# Patient Record
Sex: FEMALE | HISPANIC OR LATINO | Employment: FULL TIME | URBAN - METROPOLITAN AREA
[De-identification: names, ages, dates, MRNs, and addresses within clinical notes are randomized per-mention and may not be internally consistent; named-entity substitution may affect disease eponyms.]

---

## 2019-09-04 ENCOUNTER — TRANSCRIBE ORDERS (OUTPATIENT)
Dept: ADMINISTRATIVE | Facility: HOSPITAL | Age: 54
End: 2019-09-04

## 2019-09-23 ENCOUNTER — TRANSCRIBE ORDERS (OUTPATIENT)
Dept: MAMMOGRAPHY | Facility: HOSPITAL | Age: 54
End: 2019-09-23

## 2019-09-23 DIAGNOSIS — Z12.31 VISIT FOR SCREENING MAMMOGRAM: Primary | ICD-10-CM

## 2019-10-07 ENCOUNTER — APPOINTMENT (OUTPATIENT)
Dept: OTHER | Facility: HOSPITAL | Age: 54
End: 2019-10-07

## 2019-10-07 ENCOUNTER — HOSPITAL ENCOUNTER (OUTPATIENT)
Dept: MAMMOGRAPHY | Facility: HOSPITAL | Age: 54
Discharge: HOME | End: 2019-10-07
Admitting: OBSTETRICS & GYNECOLOGY

## 2019-10-07 DIAGNOSIS — Z12.31 VISIT FOR SCREENING MAMMOGRAM: ICD-10-CM

## 2019-10-07 PROCEDURE — 77067 SCR MAMMO BI INCL CAD: CPT

## 2019-10-07 PROCEDURE — 77063 BREAST TOMOSYNTHESIS BI: CPT | Performed by: RADIOLOGY

## 2019-10-07 PROCEDURE — 77063 BREAST TOMOSYNTHESIS BI: CPT

## 2019-10-07 PROCEDURE — 77067 SCR MAMMO BI INCL CAD: CPT | Performed by: RADIOLOGY

## 2020-09-09 ENCOUNTER — TRANSCRIBE ORDERS (OUTPATIENT)
Dept: ADMINISTRATIVE | Facility: HOSPITAL | Age: 55
End: 2020-09-09

## 2020-09-09 DIAGNOSIS — Z12.31 VISIT FOR SCREENING MAMMOGRAM: Primary | ICD-10-CM

## 2020-10-12 ENCOUNTER — OFFICE VISIT (OUTPATIENT)
Dept: OBSTETRICS AND GYNECOLOGY | Facility: CLINIC | Age: 55
End: 2020-10-12

## 2020-10-12 VITALS
WEIGHT: 111 LBS | DIASTOLIC BLOOD PRESSURE: 60 MMHG | HEIGHT: 65 IN | SYSTOLIC BLOOD PRESSURE: 92 MMHG | BODY MASS INDEX: 18.49 KG/M2

## 2020-10-12 DIAGNOSIS — N94.10 FEMALE DYSPAREUNIA: ICD-10-CM

## 2020-10-12 DIAGNOSIS — Z01.411 ENCOUNTER FOR GYNECOLOGICAL EXAMINATION WITH ABNORMAL FINDING: Primary | ICD-10-CM

## 2020-10-12 DIAGNOSIS — R63.4 WEIGHT LOSS: ICD-10-CM

## 2020-10-12 PROCEDURE — 99396 PREV VISIT EST AGE 40-64: CPT | Performed by: OBSTETRICS & GYNECOLOGY

## 2020-10-12 RX ORDER — MELATONIN
1000 DAILY
COMMUNITY
End: 2022-10-07 | Stop reason: SDUPTHER

## 2020-10-12 RX ORDER — PRASTERONE 6.5 MG/1
1 INSERT VAGINAL DAILY
COMMUNITY
End: 2020-10-12 | Stop reason: SDUPTHER

## 2020-10-12 RX ORDER — PRASTERONE 6.5 MG/1
1 INSERT VAGINAL DAILY
Qty: 90 EACH | Refills: 3 | Status: SHIPPED | OUTPATIENT
Start: 2020-10-12 | End: 2021-10-19 | Stop reason: SDUPTHER

## 2020-10-12 NOTE — PROGRESS NOTES
GYN Annual Exam     CC - Here for annual exam.     Subjective   HPI  Tiny Jolley is a 55 y.o. female, , who presents for annual well woman exam.  She is postmenopausal.  Patient reports problems with: vaginal dryness and vaginal itching. She is not currently sexually active b/c of such bad dyspareunia.    Partner Status: Marital Status: .  New Partners since last visit: no.      Additional OB/GYN History   Current contraception: contraceptive methods: Post menopausal status  Desires to: continue contraception  Last Pap :   Last Completed Pap Smear       Status Date      PAP SMEAR Done 2019 neg, neg HPV        History of abnormal Pap smear: no  Family history of uterine, colon, breast, or ovarian cancer: no  Performs monthly Self-Breast Exam: yes  Last mammogram:   Last Completed Mammogram       Status Date      MAMMOGRAM Done 10/7/2019 MAMMO SCREENING DIGITAL TOMOSYNTHESIS BILATERAL W CAD        Last colonoscopy:   Last Completed Colonoscopy       Status Date      COLONOSCOPY Done 3/1/2020 nl per pt        Last DEXA: 2017 osteopenia   Exercises Regularly: no  Feelings of Anxiety or Depression: occasional sadness but denies depression    Tobacco Usage?: No   OB History        2    Para   1    Term   1            AB   1    Living           SAB   1    TAB        Ectopic        Molar        Multiple        Live Births                    Health Maintenance   Topic Date Due   • Annual Gynecologic Pelvic and Breast Exam  1965   • ANNUAL PHYSICAL  1968   • ZOSTER VACCINE (1 of 2) 2015   • INFLUENZA VACCINE  2020   • HEPATITIS C SCREENING  2020   • LIPID PANEL  10/12/2020   • MAMMOGRAM  10/07/2021   • PAP SMEAR  2022   • TDAP/TD VACCINES (2 - Td) 2027   • COLONOSCOPY  2030   • Pneumococcal Vaccine 0-64  Aged Out       The additional following portions of the patient's history were reviewed and updated as appropriate: allergies,  "current medications, past family history, past medical history, past social history, past surgical history and problem list.    Review of Systems   Constitutional: Negative.    HENT: Negative.    Eyes: Negative.    Respiratory: Negative.    Cardiovascular: Negative.    Gastrointestinal: Negative.    Endocrine: Negative.    Genitourinary: Negative for vaginal pain (dryness, itching).   Musculoskeletal: Negative.    Skin: Negative.    Allergic/Immunologic: Negative.    Neurological: Negative.    Hematological: Negative.    Psychiatric/Behavioral: Negative.        I have reviewed and agree with the HPI, ROS, and historical information as entered above. Ria Sofia MD    Objective   BP 92/60   Ht 165.1 cm (65\")   Wt 50.3 kg (111 lb)   BMI 18.47 kg/m²     Physical Exam    General:  well developed; well nourished  no acute distress  Skin:  No suspicious lesions seen  Thyroid: normal to inspection and palpation  Breasts:  Examined in supine position  Symmetric without masses or skin dimpling  Nipples normal without inversion, lesions or discharge  There are no palpable axillary nodes  CVS: RRR, no M/R/G, distal pulses wnl  Resp: CTAB, No W/R/R  Abdomen: soft, non-tender; no masses  no umbilical or inguinal hernias are present  no hepato-splenomegaly  Pelvis: Clinical staff was present for exam  External genitalia:  normal appearance of the external genitalia including Bartholin's and [place]'s glands.  Urethra: no masses or tenderness  Urethral meatus: normal size;  No lesions or signs of prolapse  Bladder: non tender to palpation, no masses, no prolapse  Vagina:  normal pink mucosa without prolapse or lesions.  Cervix:  normal appearance.  Uterus:  normal size, shape and consistency.  Adnexa:  normal bimanual exam of the adnexa.  Perineum/Anus: normal appearance, no external hemorrhoids  Ext: 2+ pulses, no edema    Assessment/Plan     Assessment     Problem List Items Addressed This Visit     Encounter for " gynecological examination with abnormal finding - Primary    Female dyspareunia    Weight loss          Plan     1. Pap screening guidelines reviewed  2. Pap not indicated today  3. Reviewed monthly self breast exams.  Instructed to call with lumps, pain, or breast discharge.    4. Reviewed exercise and healthy diet as a preventative health measures.   5. RTC in 1 year or PRN with problems  6. Other: Reviewed ~8lb weight loss in a year but pt feels like she's lost muscle mass.  Encouraged protein in diet, stress reduction.  Will f/u with PCP if not helping.    Ria Sofia MD  10/12/2020

## 2020-10-22 ENCOUNTER — HOSPITAL ENCOUNTER (OUTPATIENT)
Dept: MAMMOGRAPHY | Facility: HOSPITAL | Age: 55
Discharge: HOME | End: 2020-10-22
Admitting: OBSTETRICS & GYNECOLOGY

## 2020-10-22 DIAGNOSIS — Z12.31 VISIT FOR SCREENING MAMMOGRAM: ICD-10-CM

## 2020-10-22 PROCEDURE — 77067 SCR MAMMO BI INCL CAD: CPT

## 2020-10-22 PROCEDURE — 77063 BREAST TOMOSYNTHESIS BI: CPT

## 2020-10-22 PROCEDURE — 77063 BREAST TOMOSYNTHESIS BI: CPT | Performed by: RADIOLOGY

## 2020-10-22 PROCEDURE — 77067 SCR MAMMO BI INCL CAD: CPT | Performed by: RADIOLOGY

## 2021-01-19 ENCOUNTER — OFFICE VISIT (OUTPATIENT)
Dept: OBSTETRICS AND GYNECOLOGY | Facility: CLINIC | Age: 56
End: 2021-01-19

## 2021-01-19 VITALS
DIASTOLIC BLOOD PRESSURE: 66 MMHG | BODY MASS INDEX: 18.61 KG/M2 | WEIGHT: 105 LBS | HEIGHT: 63 IN | SYSTOLIC BLOOD PRESSURE: 100 MMHG

## 2021-01-19 DIAGNOSIS — N63.10 BREAST MASS, RIGHT: Primary | ICD-10-CM

## 2021-01-19 DIAGNOSIS — N94.10 FEMALE DYSPAREUNIA: ICD-10-CM

## 2021-01-19 PROCEDURE — 99213 OFFICE O/P EST LOW 20 MIN: CPT | Performed by: OBSTETRICS & GYNECOLOGY

## 2021-01-19 NOTE — PROGRESS NOTES
Chief Complaint   Patient presents with   • Follow-up     right breast pain       Subjective   HPI  Tiny Jolley is a 55 y.o. female, , who presents for right breast pain.  She had an accident at work.  She was hit by a laundry cart on the right side of her neck.        She states that about a week after her accident she had right breast pain at the 6 o'clock for about a week.  She reports the pain radiated up her right rib cage into her right arm.  She describes the severity at the time as moderate, and reports that the pain was intermittent.  She states that the problem as resolved at this time.  Denies nipple discharge.  The patient reports additional symptoms as weight loss.  She has lost 6lb unintentionally since AE in October.    Her last LMP was No LMP recorded (lmp unknown). Patient is postmenopausal..  Partner Status: Marital Status: .  New Partners since last visit: no.  Desires STD Screening: no.    Additional OB/GYN History   Current contraception: contraceptive methods: Post menopausal status  Desires to: continue contraception  Last Pap :   Last Completed Pap Smear       Status Date      PAP SMEAR Done 2019 neg, neg HPV        History of abnormal Pap smear: no  Last mammogram:   Last Completed Mammogram       Status Date      MAMMOGRAM Done 10/22/2020 MAMMO SCREENING DIGITAL TOMOSYNTHESIS BILATERAL W CAD     Patient has more history with this topic...        Tobacco Usage?: No   OB History        2    Para   1    Term   1            AB   1    Living           SAB   1    TAB        Ectopic        Molar        Multiple        Live Births                    Health Maintenance   Topic Date Due   • ANNUAL PHYSICAL  1968   • ZOSTER VACCINE (1 of 2) 2015   • INFLUENZA VACCINE  2020   • HEPATITIS C SCREENING  2020   • LIPID PANEL  10/12/2020   • Annual Gynecologic Pelvic and Breast Exam  10/13/2021   • PAP SMEAR  2022   • MAMMOGRAM   "10/22/2022   • TDAP/TD VACCINES (2 - Td) 01/01/2027   • COLONOSCOPY  03/01/2030   • Pneumococcal Vaccine 0-64  Aged Out   • MENINGOCOCCAL VACCINE  Aged Out       The additional following portions of the patient's history were reviewed and updated as appropriate: allergies, current medications, past family history, past medical history, past social history, past surgical history and problem list.    Review of Systems   Constitutional: Positive for unexpected weight loss.   HENT: Negative.    Eyes: Negative.    Respiratory: Negative.    Cardiovascular: Negative.    Gastrointestinal: Negative.    Endocrine: Negative.    Genitourinary: Positive for breast pain.   Musculoskeletal: Negative.    Skin: Negative.    Allergic/Immunologic: Negative.    Neurological: Negative.    Hematological: Negative.    Psychiatric/Behavioral: Negative.        I have reviewed and agree with the HPI, ROS, and historical information as entered above. Ria Sofia MD    Objective   /66 (BP Location: Right arm, Patient Position: Sitting)   Ht 160 cm (63\")   Wt 47.6 kg (105 lb)   LMP  (LMP Unknown)   Breastfeeding No   BMI 18.60 kg/m²     Physical Exam    Physical Exam     General:          well developed; thin/frail  no acute distress  Skin:    No suspicious lesions seen  Thyroid:           normal to inspection and palpation  Breasts:           Examined in supine position  Symmetric.  Fibrocystic breasts, area of pain in right breast ~1cm inferior to nipple ~1cm cystic area.  Nipples normal without inversion, lesions or discharge  There are no palpable axillary nodes  Resp: CTAB, No W/R/R  Abdomen:        soft, non-tender; no masses  no umbilical or inguinal hernias are present  no hepato-splenomegaly  Pelvis:  deferred  Ext: 2+ pulses, no edema    Assessment/Plan     Assessment     Problem List Items Addressed This Visit     Female dyspareunia    Breast mass, right - Primary    Relevant Orders    US Breast Right Complete    " Mammo Diagnostic Digital Tomosynthesis Right With CAD          1. Breast mass  2. Dyspareunia    Plan     Return for Annual physical.   Right diagnostic mammo/sono ordered but I think pain c/w cyst.  Encouraged decreasing caffeine and can try low dose Vit E for breast pain.    Can add luvena in addition to intrarosa for vulvovaginal symptoms, which are chronic  Weight loss - encouraged patient to see PCP.  Briefly reviewed depression/anxiety can be related to weight loss.  She has supplemented with premier protein.  Her work is strenuous and she feels she burns a lot of calories at her job.      Ria Sofia MD  01/19/2021

## 2021-02-05 ENCOUNTER — HOSPITAL ENCOUNTER (OUTPATIENT)
Dept: ULTRASOUND IMAGING | Facility: HOSPITAL | Age: 56
Discharge: HOME | End: 2021-02-05

## 2021-02-05 ENCOUNTER — HOSPITAL ENCOUNTER (OUTPATIENT)
Dept: MAMMOGRAPHY | Facility: HOSPITAL | Age: 56
Discharge: HOME | End: 2021-02-05

## 2021-02-05 DIAGNOSIS — N63.10 BREAST MASS, RIGHT: ICD-10-CM

## 2021-02-05 PROCEDURE — 77065 DX MAMMO INCL CAD UNI: CPT | Performed by: RADIOLOGY

## 2021-02-05 PROCEDURE — 76642 ULTRASOUND BREAST LIMITED: CPT

## 2021-02-05 PROCEDURE — 77065 DX MAMMO INCL CAD UNI: CPT

## 2021-02-05 PROCEDURE — 77061 BREAST TOMOSYNTHESIS UNI: CPT | Performed by: RADIOLOGY

## 2021-02-05 PROCEDURE — 76642 ULTRASOUND BREAST LIMITED: CPT | Performed by: RADIOLOGY

## 2021-02-05 PROCEDURE — G0279 TOMOSYNTHESIS, MAMMO: HCPCS

## 2021-02-10 ENCOUNTER — TELEPHONE (OUTPATIENT)
Dept: OBSTETRICS AND GYNECOLOGY | Facility: CLINIC | Age: 56
End: 2021-02-10

## 2021-02-10 DIAGNOSIS — Z12.31 ENCOUNTER FOR SCREENING MAMMOGRAM FOR MALIGNANT NEOPLASM OF BREAST: Primary | ICD-10-CM

## 2021-02-10 NOTE — TELEPHONE ENCOUNTER
She had a mammogram and US that reccommended to be repeated in 6 months.  I think the patient called to set up 6 month follow up and there was not an order in so an order was added.    The  called and understood she was to have another dx mammogram and US this week    I talked to her for a long time and I think she understands.  I will discuss with Dr Sofia to be sure I am correct.

## 2021-02-14 NOTE — TELEPHONE ENCOUNTER
I had ordered the 6mo f/u mammo and prn sono as recommended by breast center.  I don't know if she got notification in mychart and misunderstood?  Thanks

## 2021-02-15 NOTE — TELEPHONE ENCOUNTER
Yes I think that is what happened.  I just wanted to be sure that was correct and she should wait 6 months.  I told her to wait the six months

## 2021-02-22 ENCOUNTER — OFFICE VISIT (OUTPATIENT)
Dept: OBSTETRICS AND GYNECOLOGY | Facility: CLINIC | Age: 56
End: 2021-02-22

## 2021-02-22 VITALS
WEIGHT: 108.8 LBS | SYSTOLIC BLOOD PRESSURE: 92 MMHG | HEIGHT: 64 IN | BODY MASS INDEX: 18.57 KG/M2 | DIASTOLIC BLOOD PRESSURE: 64 MMHG

## 2021-02-22 DIAGNOSIS — N63.10 BREAST MASS, RIGHT: ICD-10-CM

## 2021-02-22 DIAGNOSIS — N94.10 FEMALE DYSPAREUNIA: ICD-10-CM

## 2021-02-22 DIAGNOSIS — Z01.419 ENCOUNTER FOR ANNUAL ROUTINE GYNECOLOGICAL EXAMINATION: Primary | ICD-10-CM

## 2021-02-22 PROCEDURE — 99396 PREV VISIT EST AGE 40-64: CPT | Performed by: OBSTETRICS & GYNECOLOGY

## 2021-02-22 RX ORDER — ESTRADIOL 0.1 MG/G
CREAM VAGINAL
Qty: 1 EACH | Refills: 3 | Status: SHIPPED | OUTPATIENT
Start: 2021-02-22 | End: 2022-12-23

## 2021-02-22 NOTE — PROGRESS NOTES
GYN Annual Exam     CC - Here for annual exam. Patient is a established patient. Patient requires  for today's visit.     Subjective   HPI  Tiny Turcios is a 55 y.o. female, , who presents for annual well woman exam.  She is postmenopausal.  Patient would like to discuss mammogram that was completed recently. She says she does not understand why she is having to have repeat mammograms. She has not been told something is abnormal or not. Patient would also like to discuss unexplained weight loss. Patient reports problems with: none.  Partner Status: Marital Status: .  New Partners since last visit: no.     Additional OB/GYN History   Current contraception: contraceptive methods: Post menopausal status  Desires to: do not start contraception  Last Pap : 2019  Last Completed Pap Smear       Status Date      PAP SMEAR Done 2019 neg, neg HPV        History of abnormal Pap smear: no  Family history of uterine, colon, breast, or ovarian cancer: no  Performs monthly Self-Breast Exam: yes  Last mammogram: 2021  Last Completed Mammogram       Status Date      MAMMOGRAM Done 2021 MAMMO DIAGNOSTIC DIGITAL TOMOSYNTHESIS RIGHT W CAD     Patient has more history with this topic...        Last colonoscopy: 2020  Last Completed Colonoscopy       Status Date      COLONOSCOPY Done 3/1/2020 nl per pt        Last DEXA: Approximately   Exercises Regularly: Yes, at work  Feelings of Anxiety or Depression: yes - anxiety, patient reports mild - situational due to work    Tobacco Usage?: No   OB History        2    Para   1    Term   1            AB   1    Living           SAB   1    TAB        Ectopic        Molar        Multiple        Live Births                    Health Maintenance   Topic Date Due   • ANNUAL PHYSICAL  1968   • ZOSTER VACCINE (1 of 2) 2015   • INFLUENZA VACCINE  2020   • HEPATITIS C SCREENING  2020   •  "LIPID PANEL  10/12/2020   • Annual Gynecologic Pelvic and Breast Exam  10/13/2021   • PAP SMEAR  05/01/2022   • MAMMOGRAM  02/05/2023   • TDAP/TD VACCINES (2 - Td) 01/01/2027   • COLONOSCOPY  03/01/2030   • Pneumococcal Vaccine 0-64  Aged Out   • MENINGOCOCCAL VACCINE  Aged Out       The additional following portions of the patient's history were reviewed and updated as appropriate: allergies, current medications, past family history, past medical history, past social history, past surgical history and problem list.    Review of Systems   Constitutional: Positive for unexpected weight loss.   HENT: Negative.    Eyes: Negative.    Respiratory: Negative.    Cardiovascular: Negative.    Gastrointestinal: Negative.    Endocrine: Negative.    Genitourinary: Negative.    Musculoskeletal: Negative.    Skin: Negative.    Allergic/Immunologic: Negative.    Neurological: Negative.    Hematological: Negative.    Psychiatric/Behavioral: Negative.        I have reviewed and agree with the HPI, ROS, and historical information as entered above. Ria Sofia MD    Objective   BP 92/64 (BP Location: Right arm, Patient Position: Sitting, Cuff Size: Adult)   Ht 162.6 cm (64\")   Wt 49.4 kg (108 lb 12.8 oz)   LMP  (LMP Unknown)   Breastfeeding No   BMI 18.68 kg/m²     Physical Exam    General:  well developed; well nourished  no acute distress  Skin:  No suspicious lesions seen  Thyroid: normal to inspection and palpation  Breasts:  Deferred since just performed at recent visit   CVS: RRR, no M/R/G, distal pulses wnl  Resp: CTAB, No W/R/R  Abdomen: soft, non-tender; no masses  no umbilical or inguinal hernias are present  no hepato-splenomegaly  Pelvis: Clinical staff was present for exam  External genitalia:  normal appearance of the external genitalia including Bartholin's and [place]'s glands.  Urethra: no masses or tenderness  Urethral meatus: normal size;  No lesions or signs of prolapse  Bladder: non tender to palpation, " no masses, no prolapse  Vagina:  normal pink mucosa without prolapse or lesions.  Cervix:  normal appearance.  Uterus:  normal size, shape and consistency.  Adnexa:  normal bimanual exam of the adnexa.  Perineum/Anus: normal appearance, no external hemorrhoids  Ext: 2+ pulses, no edema    Assessment/Plan     Assessment     Problem List Items Addressed This Visit     Female dyspareunia    Breast mass, right    Encounter for annual routine gynecological examination - Primary    Relevant Orders    Mammo Screening Digital Tomosynthesis Bilateral With CAD          1. GYN annual well woman exam.     Plan     1. Pap screening guidelines reviewed  2. Pap not indicated today  3. Recommended use of Vitamin D replacement and getting adequate calcium in her diet. (1500mg)  4. Mammogram ordered today  5. Reviewed exercise and healthy diet as a preventative health measures.  Can try supplementing with boost.  We have discussed her weight loss before and I have no further recommendations other than to visit her primary care physician.    6. RTC in 1 year or PRN with problems    Ria Sofia MD  02/22/2021

## 2021-07-13 ENCOUNTER — OFFICE VISIT (OUTPATIENT)
Dept: OBSTETRICS AND GYNECOLOGY | Facility: CLINIC | Age: 56
End: 2021-07-13

## 2021-07-13 VITALS
HEIGHT: 64 IN | BODY MASS INDEX: 18.61 KG/M2 | SYSTOLIC BLOOD PRESSURE: 110 MMHG | DIASTOLIC BLOOD PRESSURE: 72 MMHG | WEIGHT: 109 LBS

## 2021-07-13 DIAGNOSIS — N63.10 BREAST MASS, RIGHT: ICD-10-CM

## 2021-07-13 DIAGNOSIS — M81.8 OTHER OSTEOPOROSIS WITHOUT CURRENT PATHOLOGICAL FRACTURE: ICD-10-CM

## 2021-07-13 DIAGNOSIS — N64.4 BREAST PAIN: Primary | ICD-10-CM

## 2021-07-13 PROBLEM — M81.0 OSTEOPOROSIS: Status: ACTIVE | Noted: 2021-07-13

## 2021-07-13 PROCEDURE — 99213 OFFICE O/P EST LOW 20 MIN: CPT | Performed by: OBSTETRICS & GYNECOLOGY

## 2021-07-13 RX ORDER — VALACYCLOVIR HYDROCHLORIDE 1 G/1
1000 TABLET, FILM COATED ORAL 2 TIMES DAILY
Qty: 10 TABLET | Refills: 3 | Status: SHIPPED | OUTPATIENT
Start: 2021-07-13 | End: 2021-07-18

## 2021-07-13 RX ORDER — RALOXIFENE HYDROCHLORIDE 60 MG/1
60 TABLET, FILM COATED ORAL DAILY
Qty: 90 TABLET | Refills: 4 | Status: SHIPPED | OUTPATIENT
Start: 2021-07-13 | End: 2022-06-06

## 2021-07-13 NOTE — PROGRESS NOTES
"Chief Complaint   Patient presents with   • Breast Problem       Subjective   HPI  Tiny Turcios is a 55 y.o. female, , who presents for right breast pain. She denies a lump or skin changes. She describes the pain as \"discomfort\" and it comes/goes.  She states she has experienced this problem for 6 months.  She had a diagnostic mammogram and ultrasound in 21 which shows dense tissue and nothing concerning. She has a follow up DX mammogram 8/10/21.    Also c/o bad chest/epigastric pain p fosamax that was prescribed for osteoporosis by PCP.  She showed me Z scores of -2.1 to -2.3 in hips and spine.    Patient is postmenopausal.    Additional OB/GYN History   Last Pap :   Last Completed Pap Smear          PAP SMEAR (Every 3 Years) Next due on 2019  Done - neg, neg HPV              History of abnormal Pap smear: no  Last mammogram:   Last Completed Mammogram          Scheduled - MAMMOGRAM (Every 2 Years) Scheduled for 8/10/2021    2021  Mammo Diagnostic Digital Tomosynthesis Right With CAD    10/22/2020  Mammo Screening Digital Tomosynthesis Bilateral With CAD    10/07/2019  Mammo Screening Digital Tomosynthesis Bilateral With CAD              Tobacco Usage?: neg      and No   OB History        2    Para   1    Term   1            AB   1    Living           SAB   1    TAB        Ectopic        Molar        Multiple        Live Births                    Health Maintenance   Topic Date Due   • DXA SCAN  Never done   • ANNUAL PHYSICAL  Never done   • COVID-19 Vaccine (1) Never done   • ZOSTER VACCINE (1 of 2) Never done   • HEPATITIS C SCREENING  Never done   • LIPID PANEL  Never done   • INFLUENZA VACCINE  2021   • Annual Gynecologic Pelvic and Breast Exam  2022   • PAP SMEAR  2022   • MAMMOGRAM  2023   • TDAP/TD VACCINES (2 - Td or Tdap) 2027   • COLORECTAL CANCER SCREENING  2030   • Pneumococcal Vaccine 0-64  Aged Out " "      The additional following portions of the patient's history were reviewed and updated as appropriate: allergies, current medications, past family history, past medical history, past social history, past surgical history and problem list.    Review of Systems   Genitourinary: Positive for breast pain.   All other systems reviewed and are negative.      I have reviewed and agree with the HPI, ROS, and historical information as entered above. Ria Sofia MD    Objective   /72   Ht 162.6 cm (64\")   Wt 49.4 kg (109 lb)   LMP  (LMP Unknown)   BMI 18.71 kg/m²     OBGyn Exam     General:          well developed; well nourished  no acute distress  Skin:    No suspicious lesions seen  Breasts:           Examined in supine position  Symmetric without skin dimpling, small cystic area palpable at 6 oclock in right breast  Nipples normal without inversion, lesions or discharge  There are no palpable axillary nodes but the majority of patient's pain is in right axilla  Ext: 2+ pulses, no edema      Assessment/Plan     1. Breast pain  2. Right breast mass  - Ambulatory Referral to General Surgery  Reviewed diagnostic mammogram results.  Reviewed these are not worrisome but they just wanted a 6-month follow-up to be sure stable before returning to screening mammograms.  Patient is concerned about cost to because she had to pay over $600 for the diagnostic mammogram.    I have encouraged low-dose vitamin E or evening primrose oil for breast tenderness, but I really do not necessarily feel like patient has breast tenderness as much as she has pain in her axilla.  She states that subsignificant though that she cannot sleep on her right side.  As patient is very symptomatic despite likely benign mammogram results, will send to Dr. Main for exam and to make sure not missing something.    2. Other osteoporosis without current pathological fracture  As patient cannot take Fosamax, I have wrote a prescription for " Evista.  Still she is interested in think it is a good idea to see a specialist at     3.Return for Annual physical.      Ria Sofia MD  07/13/2021

## 2021-08-10 ENCOUNTER — HOSPITAL ENCOUNTER (OUTPATIENT)
Dept: MAMMOGRAPHY | Facility: HOSPITAL | Age: 56
Discharge: HOME | End: 2021-08-10
Admitting: OBSTETRICS & GYNECOLOGY

## 2021-08-10 DIAGNOSIS — Z12.31 ENCOUNTER FOR SCREENING MAMMOGRAM FOR MALIGNANT NEOPLASM OF BREAST: ICD-10-CM

## 2021-08-10 PROCEDURE — 77065 DX MAMMO INCL CAD UNI: CPT

## 2021-08-10 PROCEDURE — G0279 TOMOSYNTHESIS, MAMMO: HCPCS

## 2021-08-10 PROCEDURE — 77061 BREAST TOMOSYNTHESIS UNI: CPT | Performed by: RADIOLOGY

## 2021-08-10 PROCEDURE — 77065 DX MAMMO INCL CAD UNI: CPT | Performed by: RADIOLOGY

## 2021-09-24 ENCOUNTER — TELEPHONE (OUTPATIENT)
Dept: OBSTETRICS AND GYNECOLOGY | Facility: CLINIC | Age: 56
End: 2021-09-24

## 2021-09-24 NOTE — TELEPHONE ENCOUNTER
Pt. lvm with questions about her Rx. It was difficult to understand her on the VM and her VM is in [place]. I attempted to call the patient and she did not answer. I left her a VM to call me back. I also called the pharmacy and they have her Valtrex ready for her to , assuming the call was regarding her Valtrex.

## 2021-09-27 NOTE — TELEPHONE ENCOUNTER
I was unable to talk to the pt - there is a prescription for the 1000mg with few refills from 9/24/2021. I will send new rx to Dr. Sofia

## 2021-09-27 NOTE — TELEPHONE ENCOUNTER
PT IS Armenian SPEAKING, SHES REQUESTIN VALTREX 500MG QD FOR PREVENTION TO [facility]NES AND THE 1,000MG FOR OUTBREAKS, CALLED ONCE PREVIOUSLY AND FELL THROUGH THE CRACKS DUE TO VM NOT BEING UNDERSTOOD CORRECTLY. WOULD YOU LIKE ME TO RESUBMIT PHONE MESSAGE OR CAN WE SEND IT IN THIS WAY?

## 2021-09-28 RX ORDER — VALACYCLOVIR HYDROCHLORIDE 500 MG/1
500 TABLET, FILM COATED ORAL DAILY
Qty: 30 TABLET | Refills: 5 | Status: SHIPPED | OUTPATIENT
Start: 2021-09-28 | End: 2021-10-19 | Stop reason: SDUPTHER

## 2021-10-19 ENCOUNTER — OFFICE VISIT (OUTPATIENT)
Dept: OBSTETRICS AND GYNECOLOGY | Facility: CLINIC | Age: 56
End: 2021-10-19

## 2021-10-19 VITALS
BODY MASS INDEX: 18.61 KG/M2 | SYSTOLIC BLOOD PRESSURE: 110 MMHG | WEIGHT: 109 LBS | HEIGHT: 64 IN | DIASTOLIC BLOOD PRESSURE: 70 MMHG

## 2021-10-19 DIAGNOSIS — B00.9 HSV INFECTION: ICD-10-CM

## 2021-10-19 DIAGNOSIS — N94.10 FEMALE DYSPAREUNIA: Primary | ICD-10-CM

## 2021-10-19 PROCEDURE — 99213 OFFICE O/P EST LOW 20 MIN: CPT | Performed by: OBSTETRICS & GYNECOLOGY

## 2021-10-19 RX ORDER — VALACYCLOVIR HYDROCHLORIDE 500 MG/1
500 TABLET, FILM COATED ORAL DAILY
Qty: 90 TABLET | Refills: 4 | Status: SHIPPED | OUTPATIENT
Start: 2021-10-19 | End: 2021-11-18

## 2021-10-19 RX ORDER — PRASTERONE 6.5 MG/1
1 INSERT VAGINAL DAILY
Qty: 90 EACH | Refills: 3 | Status: SHIPPED | OUTPATIENT
Start: 2021-10-19 | End: 2022-06-06 | Stop reason: SDUPTHER

## 2021-10-19 NOTE — PROGRESS NOTES
GYN follow-up with  #[id number] Sergey HAMMOND - Here for annual exam but not due for it. Patient is an established patient    Subjective      HPI  Tiny Turcios is a 56 y.o. female, , who presents for annual well woman exam but not due for it.  She is postmenopausal.  Patient reports problems with: History of HSV and dyspareunia.      Patient states the Intrarosa is so helpful and she is able to use it much easier than the Estrace cream previously prescribed because that applicator was very uncomfortable.    Partner Status: Marital Status: .  New Partners since last visit: no.      Additional OB/GYN History   Current contraception: contraceptive methods: Post menopausal status    Last Pap : 2019 negative  Last Completed Pap Smear          PAP SMEAR (Every 3 Years) Next due on 2019  Done - neg, neg HPV              History of abnormal Pap smear: no  Family history of uterine, colon, breast, or ovarian cancer: no     Performs monthly Self-Breast Exam: yes  Last mammogram: 2021 Dx neg, routine MMG 10/27/21  Last Completed Mammogram          Scheduled - MAMMOGRAM (Every 2 Years) Scheduled for 10/27/2021    08/10/2021  Mammo Diagnostic Digital Tomosynthesis Right With CAD    2021  Mammo Diagnostic Digital Tomosynthesis Right With CAD    10/22/2020  Mammo Screening Digital Tomosynthesis Bilateral With CAD    10/07/2019  Mammo Screening Digital Tomosynthesis Bilateral With CAD              Last colonoscopy:  negative  Last Completed Colonoscopy          COLORECTAL CANCER SCREENING (COLONOSCOPY - Every 10 Years) Next due on 3/1/2030    2020  COLONOSCOPY (Done - nl per pt)                Exercises Regularly: yes  Feelings of Anxiety or Depression: no    Tobacco Usage?: No   OB History        2    Para   1    Term   1            AB   1    Living           SAB   1    IAB        Ectopic        Molar        Multiple        Live Births      "               Health Maintenance   Topic Date Due   • DXA SCAN  Never done   • ANNUAL PHYSICAL  Never done   • COVID-19 Vaccine (1) Never done   • ZOSTER VACCINE (1 of 2) Never done   • HEPATITIS C SCREENING  Never done   • LIPID PANEL  Never done   • INFLUENZA VACCINE  Never done   • Annual Gynecologic Pelvic and Breast Exam  02/23/2022   • PAP SMEAR  05/01/2022   • MAMMOGRAM  08/10/2023   • TDAP/TD VACCINES (2 - Td or Tdap) 01/01/2027   • COLORECTAL CANCER SCREENING  03/01/2030   • Pneumococcal Vaccine 0-64  Aged Out       The additional following portions of the patient's history were reviewed and updated as appropriate: problem list.    Review of Systems   All other systems reviewed and are negative.      I have reviewed and agree with the HPI, ROS, and historical information as entered above. Ria Sofia MD    Objective   /70   Ht 162.6 cm (64\")   Wt 49.4 kg (109 lb)   LMP  (LMP Unknown)   BMI 18.71 kg/m²     Physical Exam    General:  well developed; well nourished  no acute distress  Skin:  No suspicious lesions seen  Abdomen: soft, non-tender; no masses  no umbilical or inguinal hernias are present  Pelvis: Not performed today  Ext: 2+ pulses, no edema    Assessment/Plan     Assessment     Diagnoses and all orders for this visit:    1. Female dyspareunia (Primary)    2. HSV infection    Other orders  -     Prasterone (Intrarosa) 6.5 MG insert; Insert 1 suppository into the vagina Daily.  Dispense: 90 each; Refill: 3  -     valACYclovir (Valtrex) 500 MG tablet; Take 1 tablet by mouth Daily for 30 days.  Dispense: 90 tablet; Refill: 4      Return for Annual physical in June 2022 when pap due..    Ria Sofia MD  10/19/2021  "

## 2021-10-26 ENCOUNTER — TRANSCRIBE ORDERS (OUTPATIENT)
Dept: MAMMOGRAPHY | Facility: HOSPITAL | Age: 56
End: 2021-10-26

## 2021-10-26 DIAGNOSIS — R92.8 ABNORMAL MAMMOGRAM: Primary | ICD-10-CM

## 2021-10-27 ENCOUNTER — HOSPITAL ENCOUNTER (OUTPATIENT)
Dept: MAMMOGRAPHY | Facility: HOSPITAL | Age: 56
End: 2021-10-27

## 2022-02-01 ENCOUNTER — HOSPITAL ENCOUNTER (OUTPATIENT)
Dept: MAMMOGRAPHY | Facility: HOSPITAL | Age: 57
Discharge: HOME | End: 2022-02-01
Admitting: OBSTETRICS & GYNECOLOGY

## 2022-02-01 DIAGNOSIS — R92.8 ABNORMAL MAMMOGRAM: ICD-10-CM

## 2022-02-01 PROCEDURE — G0279 TOMOSYNTHESIS, MAMMO: HCPCS

## 2022-02-01 PROCEDURE — 77066 DX MAMMO INCL CAD BI: CPT | Performed by: RADIOLOGY

## 2022-02-01 PROCEDURE — 77062 BREAST TOMOSYNTHESIS BI: CPT | Performed by: RADIOLOGY

## 2022-02-01 PROCEDURE — 77066 DX MAMMO INCL CAD BI: CPT

## 2022-06-06 ENCOUNTER — OFFICE VISIT (OUTPATIENT)
Dept: OBSTETRICS AND GYNECOLOGY | Facility: CLINIC | Age: 57
End: 2022-06-06

## 2022-06-06 VITALS
WEIGHT: 111.8 LBS | DIASTOLIC BLOOD PRESSURE: 68 MMHG | SYSTOLIC BLOOD PRESSURE: 114 MMHG | HEIGHT: 64 IN | BODY MASS INDEX: 19.09 KG/M2

## 2022-06-06 DIAGNOSIS — M81.8 OTHER OSTEOPOROSIS WITHOUT CURRENT PATHOLOGICAL FRACTURE: ICD-10-CM

## 2022-06-06 DIAGNOSIS — N63.11 MASS OF UPPER OUTER QUADRANT OF RIGHT BREAST: ICD-10-CM

## 2022-06-06 DIAGNOSIS — N94.10 FEMALE DYSPAREUNIA: ICD-10-CM

## 2022-06-06 DIAGNOSIS — N64.4 BREAST PAIN: Primary | ICD-10-CM

## 2022-06-06 PROCEDURE — 99213 OFFICE O/P EST LOW 20 MIN: CPT | Performed by: OBSTETRICS & GYNECOLOGY

## 2022-06-06 RX ORDER — PRASTERONE 6.5 MG/1
1 INSERT VAGINAL DAILY
Qty: 90 EACH | Refills: 3 | Status: SHIPPED | OUTPATIENT
Start: 2022-06-06 | End: 2022-06-21

## 2022-06-06 RX ORDER — IBANDRONATE SODIUM 150 MG/1
150 TABLET, FILM COATED ORAL
Qty: 12 TABLET | Refills: 12 | Status: SHIPPED | OUTPATIENT
Start: 2022-06-06 | End: 2022-10-07 | Stop reason: SDUPTHER

## 2022-06-06 NOTE — PROGRESS NOTES
Chief Complaint   Patient presents with   • Follow-up     Breast pain        Subjective     HPI  Tiny Turcios is a 56 y.o. female, , who presents with breast complaints of breast tenderness.  This is present in right breast(s).    She states she has experienced this problem for 2-3 months.  She describes the severity as off and on.  She states that the problem has changed since she discovered it. The patient denies breast lump, changed mole, dryness, nipple discharge, pruritus, rash, skin color change and skin lesion(s). She has had a mammogram before. There is an area in right outer breast, which is thought to be benign and has been stable.    She does not have a family history of breast cancer. Patient states that tenderness radiates from axilla down to right breast. She reports that pain is worse at night. She also reports that the pain is mild to severe at times.     Her last LMP was No LMP recorded (lmp unknown). Patient is postmenopausal..  Periods are absent due to menopause.    Current contraception: contraceptive methods: Post menopausal status    Last mammogram:   Last Completed Mammogram          Ordered - MAMMOGRAM (Every 2 Years) Ordered on 2022  Mammo Diagnostic Digital Tomosynthesis Bilateral With CAD    08/10/2021  Mammo Diagnostic Digital Tomosynthesis Right With CAD    2021  Mammo Diagnostic Digital Tomosynthesis Right With CAD    10/22/2020  Mammo Screening Digital Tomosynthesis Bilateral With CAD    10/07/2019  Mammo Screening Digital Tomosynthesis Bilateral With CAD              Tobacco Usage?: No     OB History        2    Para   1    Term   1            AB   1    Living           SAB   1    IAB        Ectopic        Molar        Multiple        Live Births                    Past Medical History:   Diagnosis Date   • Anxiety    • Arthritis     arthritis/unspecified   • Breast mass, right 2021   • Bursitis    • Frequent UTI    •  "HSV infection    • Hypercholesterolemia    • Migraine    • Palpitations    • Renal calculi        Past Surgical History:   Procedure Laterality Date   •  SECTION     • COLONOSCOPY     • KIDNEY STONE SURGERY      lithotripsy       Family History   Problem Relation Age of Onset   • Stomach cancer Other    • Diabetes Other    • Hyperlipidemia Other    • Heart disease Other    • Osteoporosis Other    • Osteoporosis Maternal Uncle    • Breast cancer Neg Hx    • Ovarian cancer Neg Hx           Review of Systems   Constitutional: Negative.    HENT: Negative.    Eyes: Negative.    Respiratory: Negative.    Cardiovascular: Negative.    Gastrointestinal: Negative.    Endocrine: Negative.    Genitourinary: Negative.  Positive for breast pain.   Musculoskeletal: Negative.    Skin: Negative.    Allergic/Immunologic: Negative.    Neurological: Negative.    Hematological: Negative.    Psychiatric/Behavioral: Negative.        I have reviewed and agree with the HPI, ROS, and historical information as entered above. Ria Sofia MD    Objective   /68   Ht 162.6 cm (64\")   Wt 50.7 kg (111 lb 12.8 oz)   LMP  (LMP Unknown)   BMI 19.19 kg/m²     Physical Exam       General:  well developed; well nourished  no acute distress   Breasts:  Examined in supine position  Symmetric without masses or skin dimpling  Nipples normal without inversion, lesions or discharge  There are no palpable axillary nodes  Pain is in right lateral breast at about 9-11 oclock, no discreet mass found but dense breast tissue which is symmetric.       Assessment & Plan     Assessment     Problem List Items Addressed This Visit     Female dyspareunia    Breast mass, right    Osteoporosis    Breast pain - Primary    Relevant Orders    Ambulatory Referral to General Surgery (Completed)            Plan   1.  Discussed fibrocystic disease and alleviating measures including decreasing caffeine, low dose Vit E, and EPO.  2. Will refer for surgical " consult.  3. Needs refill on Intrarosa.  Patient states it helps so much for her dyspareunia  Return for Annual physical.      Ria Sofia MD  06/06/2022

## 2022-06-15 ENCOUNTER — TELEPHONE (OUTPATIENT)
Dept: OBSTETRICS AND GYNECOLOGY | Facility: CLINIC | Age: 57
End: 2022-06-15

## 2022-06-15 ENCOUNTER — DOCUMENTATION (OUTPATIENT)
Dept: OBSTETRICS AND GYNECOLOGY | Facility: CLINIC | Age: 57
End: 2022-06-15

## 2022-06-15 NOTE — PROGRESS NOTES
PA submitted via covermymeds on 6/15/2022 for Intrarosa. PA pending.  (Vee: [id number])    YURI Min

## 2022-06-16 ENCOUNTER — TELEPHONE (OUTPATIENT)
Dept: OBSTETRICS AND GYNECOLOGY | Facility: CLINIC | Age: 57
End: 2022-06-16

## 2022-06-16 NOTE — TELEPHONE ENCOUNTER
[facility] fax form to fill out regarding the Intraosa PA, filled out and faxed back by Marcia. As of 6/20/22 the PA is under review.

## 2022-06-17 ENCOUNTER — TRANSCRIBE ORDERS (OUTPATIENT)
Dept: MAMMOGRAPHY | Facility: HOSPITAL | Age: 57
End: 2022-06-17

## 2022-06-17 DIAGNOSIS — M79.621 AXILLARY PAIN, RIGHT: Primary | ICD-10-CM

## 2022-06-21 RX ORDER — PRASTERONE 6.5 MG/1
1 INSERT VAGINAL DAILY
Qty: 90 EACH | Refills: 3 | Status: SHIPPED | OUTPATIENT
Start: 2022-06-21 | End: 2022-10-07 | Stop reason: SDUPTHER

## 2022-06-22 ENCOUNTER — TELEPHONE (OUTPATIENT)
Dept: OBSTETRICS AND GYNECOLOGY | Facility: CLINIC | Age: 57
End: 2022-06-22

## 2022-06-22 NOTE — TELEPHONE ENCOUNTER
Step therapies are Osphena, Invexxy and Premarin cream. She has tried Estrace vaginal cream. They are going to offer her a  coupon for 88. The PA was denied for Intrarosa. If she does want to pay that much she will need to call the office.

## 2022-06-22 NOTE — TELEPHONE ENCOUNTER
[facility] called about the Intraosa and said that there needed to be a step therapy sheet that is filled out by the provider just showing what other medications she has taken in order for this to be covered. We can call [phone] to provide them with this.

## 2022-06-23 ENCOUNTER — HOSPITAL ENCOUNTER (OUTPATIENT)
Dept: ULTRASOUND IMAGING | Facility: HOSPITAL | Age: 57
Discharge: HOME | End: 2022-06-23

## 2022-06-23 ENCOUNTER — HOSPITAL ENCOUNTER (OUTPATIENT)
Dept: MAMMOGRAPHY | Facility: HOSPITAL | Age: 57
Discharge: HOME | End: 2022-06-23

## 2022-06-23 DIAGNOSIS — M79.621 AXILLARY PAIN, RIGHT: ICD-10-CM

## 2022-06-23 PROCEDURE — 76642 ULTRASOUND BREAST LIMITED: CPT | Performed by: RADIOLOGY

## 2022-06-23 PROCEDURE — G0279 TOMOSYNTHESIS, MAMMO: HCPCS

## 2022-06-23 PROCEDURE — 77065 DX MAMMO INCL CAD UNI: CPT | Performed by: RADIOLOGY

## 2022-06-23 PROCEDURE — 77065 DX MAMMO INCL CAD UNI: CPT

## 2022-06-23 PROCEDURE — 77061 BREAST TOMOSYNTHESIS UNI: CPT | Performed by: RADIOLOGY

## 2022-06-23 PROCEDURE — 76642 ULTRASOUND BREAST LIMITED: CPT

## 2022-06-30 ENCOUNTER — OFFICE VISIT (OUTPATIENT)
Dept: FAMILY MEDICINE CLINIC | Facility: CLINIC | Age: 57
End: 2022-06-30

## 2022-06-30 VITALS
WEIGHT: 110 LBS | HEART RATE: 66 BPM | HEIGHT: 64 IN | DIASTOLIC BLOOD PRESSURE: 60 MMHG | TEMPERATURE: 97.8 F | SYSTOLIC BLOOD PRESSURE: 104 MMHG | BODY MASS INDEX: 18.78 KG/M2 | OXYGEN SATURATION: 100 %

## 2022-06-30 DIAGNOSIS — M54.50 ACUTE LEFT-SIDED LOW BACK PAIN WITHOUT SCIATICA: Primary | ICD-10-CM

## 2022-06-30 PROCEDURE — 99213 OFFICE O/P EST LOW 20 MIN: CPT | Performed by: PHYSICIAN ASSISTANT

## 2022-06-30 RX ORDER — METHOCARBAMOL 500 MG/1
500 TABLET, FILM COATED ORAL 4 TIMES DAILY PRN
Qty: 40 TABLET | Refills: 0 | Status: SHIPPED | OUTPATIENT
Start: 2022-06-30 | End: 2022-10-07 | Stop reason: SDUPTHER

## 2022-06-30 NOTE — PROGRESS NOTES
Follow Up Office Visit      Date: 2022   Patient Name: Tiny Turcios  : 1965   MRN: [id number]     Chief Complaint:    Chief Complaint   Patient presents with   • Back Pain       History of Present Illness: Tiny Turcios is a 56 y.o. female who is here today to follow up with low back pain.  2 days ago she was bending over cleaning her toilet when she felt pain in her lower back.  She has been using heat and ice and rest and it is better but still sore.  She denies any pain radiating to lower extremities, no bowel or bladder problems.      Subjective      Review of systems:  Review of Systems   Constitutional: Negative for fatigue and fever.   HENT: Negative for trouble swallowing.    Eyes: Negative for visual disturbance.   Respiratory: Negative for shortness of breath.    Cardiovascular: Negative for chest pain and leg swelling.   Musculoskeletal: Positive for back pain.        I have reviewed and the following portions of the patient's history were updated as appropriate: past family history, past medical history, past social history, past surgical history and problem list.    Medications:     Current Outpatient Medications:   •  cholecalciferol (VITAMIN D3) 25 MCG (1000 UT) tablet, Take 1,000 Units by mouth Daily., Disp: , Rfl:   •  estradiol (ESTRACE VAGINAL) 0.1 MG/GM vaginal cream, Insert 1 gm intravaginally 3 times each week for 3 weeks and then weekly thereafter., Disp: 1 each, Rfl: 3  •  ibandronate (Boniva) 150 MG tablet, Take 1 tablet by mouth Every 30 (Thirty) Days. Take 60 minutes before first morning food, drink or other medication. Avoid lying down for 60 minutes after dose administered., Disp: 12 tablet, Rfl: 12  •  Prasterone (Intrarosa) 6.5 MG insert, Insert 1 suppository into the vagina Daily. For dyspareunia, Disp: 90 each, Rfl: 3  •  methocarbamol (Robaxin) 500 MG tablet, Take 1 tablet by mouth 4 (Four) Times a Day As Needed for Muscle Spasms.,  "Disp: 40 tablet, Rfl: 0    Allergies:   No Known Allergies    Objective     Vital Signs:   Vitals:    06/30/22 0839   BP: 104/60   Pulse: 66   Temp: 97.8 °F (36.6 °C)   SpO2: 100%   Weight: 49.9 kg (110 lb)   Height: 162.6 cm (64.02\")   PainSc: 0-No pain     Body mass index is 18.87 kg/m².   BMI is within normal parameters. No other follow-up for BMI required.      Physical Exam:   Physical Exam  Vitals and nursing note reviewed.   Musculoskeletal:      Lumbar back: Spasms and tenderness present. Negative right straight leg raise test and negative left straight leg raise test.   Neurological:      Mental Status: She is alert.          Assessment / Plan      Assessment/Plan:   Diagnoses and all orders for this visit:    1. Acute left-sided low back pain without sciatica (Primary)  -     methocarbamol (Robaxin) 500 MG tablet; Take 1 tablet by mouth 4 (Four) Times a Day As Needed for Muscle Spasms.  Dispense: 40 tablet; Refill: 0    Continue heat to area.  We will add Robaxin as needed.  Gentle stretching and low back strain care handout given.  If she has any problems or if this does not continue to improve she will let me know.    Follow Up:   No follow-ups on file.    Rochelle Stewart PA-C   [facility] [place]  "

## 2022-07-08 ENCOUNTER — TELEPHONE (OUTPATIENT)
Dept: FAMILY MEDICINE CLINIC | Facility: CLINIC | Age: 57
End: 2022-07-08

## 2022-07-08 DIAGNOSIS — M54.50 ACUTE LEFT-SIDED LOW BACK PAIN WITHOUT SCIATICA: Primary | ICD-10-CM

## 2022-07-08 NOTE — TELEPHONE ENCOUNTER
Caller: Tiny Turcios    Relationship: Self    Best call back number: [phone]     What is the medical concern/diagnosis: BACK PAIN    What specialty or service is being requested: PHYSICAL THERAPY    What is the provider, practice or medical service name: PATIENT WOULD LIKE TO HAVE AN APPOINTMENT AT A LOCATION NEAR [facility].

## 2022-08-23 ENCOUNTER — TREATMENT (OUTPATIENT)
Dept: PHYSICAL THERAPY | Facility: CLINIC | Age: 57
End: 2022-08-23

## 2022-08-23 DIAGNOSIS — M54.50 CHRONIC MIDLINE LOW BACK PAIN WITHOUT SCIATICA: Primary | ICD-10-CM

## 2022-08-23 DIAGNOSIS — G89.29 CHRONIC MIDLINE LOW BACK PAIN WITHOUT SCIATICA: Primary | ICD-10-CM

## 2022-08-23 PROCEDURE — 97162 PT EVAL MOD COMPLEX 30 MIN: CPT | Performed by: PHYSICAL THERAPIST

## 2022-08-23 PROCEDURE — 97530 THERAPEUTIC ACTIVITIES: CPT | Performed by: PHYSICAL THERAPIST

## 2022-08-23 PROCEDURE — 97110 THERAPEUTIC EXERCISES: CPT | Performed by: PHYSICAL THERAPIST

## 2022-08-23 NOTE — PROGRESS NOTES
Physical Therapy Initial Evaluation and Plan of Care      Patient: Tiny Turcios   : 1965  Diagnosis/ICD-10 Code:  No primary diagnosis found.  Referring practitioner: AGUEDA Louie    Subjective Evaluation    History of Present Illness  Date of onset: 2022  Mechanism of injury: Insidious onset    Subjective comment: Woke up in early  with low back pain that she can recall a reason that would have caused it.  Denies pain, numbness and tingling in either leg.  Pain is located along the center of the upper low back region and travels to the low back/pelvic region.  Has had a history of kidney stones and her current pain does not feel like that at all.  Has been diagnosed with osteoporosis that she is currently working on getting a new Rx as the last medication was making her sick.  Denies falls. (Denies nocturnal pain.)  Patient Occupation: Deskwork at a [employer]   Precautions and Work Restrictions: noneQuality of life: excellent    Pain  Alleviating factors: heat; movement (previous PT exercises)  Exacerbated by: bending forward; carrying dog (15 lbs); getting out of bed; rotating body while walking.  Progression: improved (A little bit better.)    Social Support  Lives with: spouse    Patient Goals  Patient goals for therapy: decreased pain, increased motion and independence with ADLs/IADLs  Patient goal: Be able to dance without pain.         *CAMERON:  20%    Objective          Neurological Testing     Reflexes   Left   Patellar (L4): trace (1+)  Achilles (S1): absent (0)    Right   Patellar (L4): trace (1+)  Achilles (S1): absent (0)    Active Range of Motion     Lumbar   Flexion: 25 degrees   Extension: 25 degrees   Left lateral flexion: 45 degrees   Right lateral flexion: 45 degrees     Passive Range of Motion     Additional Passive Range of Motion Details  *Hip IR PROM:  35 deg B/L  *Hip flx in supine:  100 deg (little pulling in R L/S region)    Tests      Additional Tests Details  *(+) slump in R posterior thigh with full DF and knee ext           Assessment & Plan     Assessment  Impairments: abnormal or restricted ROM, activity intolerance, lacks appropriate home exercise program and pain with function  Functional Limitations: carrying objects, lifting, sitting, standing, stooping and unable to perform repetitive tasks  Assessment details: Mrs. Scout Jolley is a very pleasant 56 year old female that presents to physical therapy with a 3 month history of low back pain without radiculopathy that started insidiously.  PMH was covered and reviewed during interview.  Neurological exam is unremarkable.  Trunk mobility is limited primarily into flexion.  Has excellent hip internal rotation mobility.  I do not suspect a lumbar spine fracture based on pain starting insidiously, considering her history of untreated osteoporosis.  If symptoms are not improved in a 4-6 weeks, I will recommend a radiograph.    Prognosis: good    Goals  Plan Goals: STGs:  1.)  CAMERON improved by at least 5 points in 4 weeks.  2.)  Have 60 deg of trunk flexion mobility without pain in 6 weeks.  LTGs:  1.)  Self report at least 80% improvement in symptoms and function during ADLs/iADLs in 8 weeks.  2.)  Have no pain during participation in ADLs/iADLs in 8 weeks.    Plan  Planned modality interventions: thermotherapy (hydrocollator packs)  Planned therapy interventions: manual therapy, therapeutic activities, stretching, strengthening, neuromuscular re-education, abdominal trunk stabilization, balance/weight-bearing training, functional ROM exercises and home exercise program  Frequency: 1x week  Duration in visits: 1  Duration in weeks: 8  Treatment plan discussed with: patient    *Provided HEP with written and verbal instruction (included in total time billed as TE below).    Manual Therapy:         mins  77097;  Therapeutic Exercise:    15     mins  11172;     Neuromuscular Kenn:         mins  12313;    Therapeutic Activity:     9     mins  72949;     Gait Training:           mins  28773;     Ultrasound:          mins  42832;    Electrical Stimulation:         mins  13268 ( );  Dry Needling          mins self-pay    Timed Treatment:   24   mins   Total Treatment:     60   mins    PT SIGNATURE: Jonah Veronica, ALYCIA   DATE TREATMENT INITIATED: 8/23/2022    Initial Certification  Certification Period: 11/21/2022  I certify that the therapy services are furnished while this patient is under my care.  The services outlined above are required by this patient, and will be reviewed every 90 days.     PHYSICIAN: Rochelle Stewart PA  NPI: [id number]                                      DATE:    Please sign and return via fax to [phone].. Thank you, [facility].

## 2022-09-28 ENCOUNTER — OFFICE VISIT (OUTPATIENT)
Dept: FAMILY MEDICINE CLINIC | Facility: CLINIC | Age: 57
End: 2022-09-28

## 2022-09-28 ENCOUNTER — TELEPHONE (OUTPATIENT)
Dept: FAMILY MEDICINE CLINIC | Facility: CLINIC | Age: 57
End: 2022-09-28

## 2022-09-28 ENCOUNTER — NURSE TRIAGE (OUTPATIENT)
Dept: CALL CENTER | Facility: HOSPITAL | Age: 57
End: 2022-09-28

## 2022-09-28 VITALS
HEART RATE: 81 BPM | SYSTOLIC BLOOD PRESSURE: 117 MMHG | HEIGHT: 64 IN | TEMPERATURE: 97.6 F | DIASTOLIC BLOOD PRESSURE: 74 MMHG | OXYGEN SATURATION: 100 % | BODY MASS INDEX: 18.2 KG/M2 | WEIGHT: 106.6 LBS

## 2022-09-28 DIAGNOSIS — Z78.9 TELEPHONE LANGUAGE INTERPRETER SERVICE REQUIRED: ICD-10-CM

## 2022-09-28 DIAGNOSIS — R00.0 TACHYCARDIA: ICD-10-CM

## 2022-09-28 DIAGNOSIS — K21.00 GASTROESOPHAGEAL REFLUX DISEASE WITH ESOPHAGITIS WITHOUT HEMORRHAGE: Primary | ICD-10-CM

## 2022-09-28 DIAGNOSIS — R63.6 UNDERWEIGHT: ICD-10-CM

## 2022-09-28 PROBLEM — Z75.8 TELEPHONE LANGUAGE INTERPRETER SERVICE REQUIRED: Status: ACTIVE | Noted: 2022-09-28

## 2022-09-28 PROCEDURE — 99214 OFFICE O/P EST MOD 30 MIN: CPT | Performed by: PHYSICIAN ASSISTANT

## 2022-09-28 RX ORDER — PANTOPRAZOLE SODIUM 40 MG/1
40 TABLET, DELAYED RELEASE ORAL DAILY
Qty: 30 TABLET | Refills: 5 | Status: SHIPPED | OUTPATIENT
Start: 2022-09-28 | End: 2022-10-07 | Stop reason: SDUPTHER

## 2022-09-28 NOTE — TELEPHONE ENCOUNTER
Caller: Tiny Turcios    Relationship to patient: Self    Best call back number:    Patient is needing: PATIENT CALLED INTO HUB AND I GOT AN  ON MY OTHER LINE; WHILE AT WORK YESTERDAY, PATIENT WAS DIAGNOSED BY THEIR NURSE WITH TACHYCARDIA WITH A HEART RATE  AND BLOOD PRESSURE  (NO BOTTOM NUMBER GIVEN), THEN IT STARTED TO FALL FROM ; PATIENT WANTED AN APPT TODAY, BUT I REACHED OUT TO [facility] FOR FURTHER ASSISTANCE FOR TACHYCARDIA ISSUE

## 2022-09-28 NOTE — TELEPHONE ENCOUNTER
Romeo- a Eritrean interpretor  - She had an episode of tachycardia in the range of 200 with reported high then low bp, unsure of the number. It last 15 minutes. No chest pain sob or light headiness, when it occurred yesterday. She did not drink caffeine or sodas. It is not present now. Tried to cb back line not successful. Advise to call PCP today. Unable to get the back line this am. If this occurs again go go the ED.     Reason for Disposition  • [1] Palpitations AND [2] no improvement after using CARE ADVICE    Additional Information  • Negative: Passed out (i.e., lost consciousness, collapsed and was not responding)  • Negative: Shock suspected (e.g., cold/pale/clammy skin, too weak to stand, low BP, rapid pulse)  • Negative: Difficult to awaken or acting confused (e.g., disoriented, slurred speech)  • Negative: Visible sweat on face or sweat dripping down face  • Negative: Unable to walk, or can only walk with assistance (e.g., requires support)  • Negative: [1] Received SHOCK from implantable cardiac defibrillator AND [2] persisting symptoms (i.e., palpitations, lightheadedness)  • Negative: [1] Dizziness, lightheadedness, or weakness AND [2] heart beating very rapidly (e.g., > 140 / minute)  • Negative: [1] Dizziness, lightheadedness, or weakness AND [2] heart beating very slowly  (e.g., < 50 / minute)  • Negative: Sounds like a life-threatening emergency to the triager  • Negative: Chest pain  • Negative: Implantable Cardiac Defibrillator (ICD) or a pacemaker symptoms or questions  • Negative: Difficulty breathing  • Negative: Dizziness, lightheadedness, or weakness  • Negative: [1] Heart beating very rapidly (e.g., > 140 / minute) AND [2] present now  (Exception: during exercise)  • Negative: Heart beating very slowly (e.g., < 50 / minute)  (Exception: athlete and heart rate normal for caller)  • Negative: New or worsened shortness of breath with activity (dyspnea on exertion)  • Negative: Patient sounds very  "sick or weak to the triager  • Negative: [1] Heart beating very rapidly (e.g., > 140 / minute) AND [2] not present now  (Exception: during exercise)  • Negative: [1] Skipped or extra beat(s) AND [2] increases with exercise or exertion  • Negative: [1] Skipped or extra beat(s) AND [2] occurs 4 or more times per minute  • Negative: New or worsened ankle swelling  • Negative: History of heart disease  (i.e., heart attack, bypass surgery, angina, angioplasty, CHF) (Exception: brief heartbeat symptoms that went away and now feels well)  • Negative: Age > 60 years (Exception: brief heartbeat symptoms that went away and now feels well)  • Negative: Taking water pill (i.e., diuretic) or heart medication (e.g., digoxin)  • Negative: Wearing a \"Holter monitor\" or \"cardiac event monitor\"  • Negative: [1] Received SHOCK from implantable cardiac defibrillator AND [2] now feels well  • Negative: Heart rhythm alert (e.g., \"you have irregular heartbeat\") from personal wearable device (e.g., Apple Watch)  • Negative: History of hyperthyroidism or taking thyroid medication  • Negative: Substance use (drug use) or misuse, known or suspected  • Negative: [1] ADHD AND [2] taking stimulant medication    Answer Assessment - Initial Assessment Questions  1. DESCRIPTION: \"Please describe your heart rate or heartbeat that you are having\" (e.g., fast/slow, regular/irregular, skipped or extra beats, \"palpitations\")      Fast heart rate in 200 range yesterday   2. ONSET: \"When did it start?\" (Minutes, hours or days)       Yesterday   3. DURATION: \"How long does it last\" (e.g., seconds, minutes, hours)      15 minutes.   4. PATTERN \"Does it come and go, or has it been constant since it started?\"  \"Does it get worse with exertion?\"   \"Are you feeling it now?\"      She feels okay now  5. TAP: \"Using your hand, can you tap out what you are feeling on a chair or table in front of you, so that I can hear?\" (Note: not all patients can do this)        " "no  6. HEART RATE: \"Can you tell me your heart rate?\" \"How many beats in 15 seconds?\"  (Note: not all patients can do this)        75  7. RECURRENT SYMPTOM: \"Have you ever had this before?\" If Yes, ask: \"When was the last time?\" and \"What happened that time?\"       Yes - Along time ago, it stopped.   8. CAUSE: \"What do you think is causing the palpitations?\"      Do not know   9. CARDIAC HISTORY: \"Do you have any history of heart disease?\" (e.g., heart attack, angina, bypass surgery, angioplasty, arrhythmia)       no  10. OTHER SYMPTOMS: \"Do you have any other symptoms?\" (e.g., dizziness, chest pain, sweating, difficulty breathing)        no  11. PREGNANCY: \"Is there any chance you are pregnant?\" \"When was your last menstrual period?\"        n    Protocols used: HEART RATE AND HEARTBEAT QUESTIONS-ADULT-      "

## 2022-10-05 ENCOUNTER — OFFICE VISIT (OUTPATIENT)
Dept: FAMILY MEDICINE CLINIC | Facility: CLINIC | Age: 57
End: 2022-10-05

## 2022-10-05 VITALS
WEIGHT: 107 LBS | HEIGHT: 64 IN | SYSTOLIC BLOOD PRESSURE: 110 MMHG | TEMPERATURE: 98.6 F | HEART RATE: 96 BPM | BODY MASS INDEX: 18.27 KG/M2 | OXYGEN SATURATION: 97 % | DIASTOLIC BLOOD PRESSURE: 72 MMHG

## 2022-10-05 DIAGNOSIS — F41.9 ANXIETY: ICD-10-CM

## 2022-10-05 DIAGNOSIS — R00.2 PALPITATIONS: Primary | ICD-10-CM

## 2022-10-05 PROCEDURE — 99213 OFFICE O/P EST LOW 20 MIN: CPT | Performed by: PHYSICIAN ASSISTANT

## 2022-10-05 RX ORDER — ESCITALOPRAM OXALATE 5 MG/1
5 TABLET ORAL DAILY
Qty: 30 TABLET | Refills: 5 | Status: SHIPPED | OUTPATIENT
Start: 2022-10-05 | End: 2022-10-07 | Stop reason: SDUPTHER

## 2022-10-05 NOTE — PROGRESS NOTES
Follow Up Office Visit      Date: 10/05/2022   Patient Name: Tiny Turcios  : 1965   MRN: [id number]     Chief Complaint:    Chief Complaint   Patient presents with   • Anxiety       History of Present Illness: Tiny Turcios is a 57 y.o. female who is here today to follow up with anxiety.  She becomes anxious and also does have palpitations.  She is unsure if the anxiousness is causing the palpitations or if she gets palpitations and then becomes anxious.      Subjective      Review of systems:  Review of Systems   Constitutional: Negative for fatigue and fever.   HENT: Negative for trouble swallowing.    Eyes: Negative for visual disturbance.   Respiratory: Negative for shortness of breath.    Cardiovascular: Positive for palpitations. Negative for chest pain and leg swelling.   Psychiatric/Behavioral: The patient is nervous/anxious.         I have reviewed and the following portions of the patient's history were updated as appropriate: past family history, past medical history, past social history, past surgical history and problem list.    Medications:     Current Outpatient Medications:   •  estradiol (ESTRACE VAGINAL) 0.1 MG/GM vaginal cream, Insert 1 gm intravaginally 3 times each week for 3 weeks and then weekly thereafter. (Patient taking differently: As Needed. Insert 1 gm intravaginally 3 times each week for 3 weeks and then weekly thereafter.), Disp: 1 each, Rfl: 3  •  cholecalciferol (VITAMIN D3) 25 MCG (1000 UT) tablet, Take 1 tablet by mouth Daily., Disp: 90 tablet, Rfl: 3  •  NON FORMULARY, Chocolate with magnesium and potassium in it., Disp: , Rfl:   •  pantoprazole (Protonix) 40 MG EC tablet, Take 1 tablet by mouth Daily. For heartburn/gerd (Patient taking differently: Take 1 tablet by mouth As Needed. For heartburn/gerd), Disp: 90 tablet, Rfl: 1    Allergies:   No Known Allergies    Objective     Vital Signs:   Vitals:    10/05/22 1357   BP: 110/72   Pulse:  "96   Temp: 98.6 °F (37 °C)   SpO2: 97%   Weight: 48.5 kg (107 lb)   Height: 162.6 cm (64.02\")   PainSc: 0-No pain     Body mass index is 18.36 kg/m².   BMI is below normal parameters (malnutrition). Recommendations: Discussed increase caloric diet.  She will monitor.      Physical Exam:   Physical Exam  Vitals and nursing note reviewed.   Constitutional:       Appearance: Normal appearance.   HENT:      Head: Normocephalic and atraumatic.      Right Ear: Tympanic membrane and ear canal normal.      Left Ear: Tympanic membrane and ear canal normal.      Nose: No congestion or rhinorrhea.      Mouth/Throat:      Mouth: Mucous membranes are moist.      Pharynx: Oropharynx is clear. No posterior oropharyngeal erythema.   Cardiovascular:      Rate and Rhythm: Normal rate and regular rhythm.   Pulmonary:      Effort: Pulmonary effort is normal.      Breath sounds: Normal breath sounds. No decreased breath sounds, wheezing, rhonchi or rales.   Musculoskeletal:      Cervical back: Neck supple.      Right lower leg: No edema.      Left lower leg: No edema.   Lymphadenopathy:      Cervical: No cervical adenopathy.   Neurological:      Mental Status: She is alert.   Psychiatric:         Mood and Affect: Mood is anxious.          Assessment / Plan      Assessment/Plan:   Diagnoses and all orders for this visit:    1. Palpitations (Primary)  -     Ambulatory Referral to Cardiology    2. Anxiety  -     Discontinue: escitalopram (Lexapro) 5 MG tablet; Take 1 tablet by mouth Daily. For anxiety  Dispense: 30 tablet; Refill: 5    Will start Lexapro as directed.  Hopefully this will help with anxiety.  We will go ahead and refer to cardiology because she is very worried about her palpitations.  Also recommend that she decrease any caffeine intake as well.  Follow-up in 4 weeks for recheck or sooner if needed.    Follow Up:   No follow-ups on file.    Rochelle Stewart PA-C   [facility] [place]  "

## 2022-10-07 ENCOUNTER — OFFICE VISIT (OUTPATIENT)
Dept: FAMILY MEDICINE CLINIC | Facility: CLINIC | Age: 57
End: 2022-10-07

## 2022-10-07 VITALS
OXYGEN SATURATION: 99 % | RESPIRATION RATE: 21 BRPM | BODY MASS INDEX: 18.03 KG/M2 | WEIGHT: 105.6 LBS | HEIGHT: 64 IN | HEART RATE: 75 BPM | DIASTOLIC BLOOD PRESSURE: 70 MMHG | TEMPERATURE: 98 F | SYSTOLIC BLOOD PRESSURE: 106 MMHG

## 2022-10-07 DIAGNOSIS — Z23 IMMUNIZATION DUE: ICD-10-CM

## 2022-10-07 DIAGNOSIS — Z78.0 ENCOUNTER FOR OSTEOPOROSIS SCREENING IN ASYMPTOMATIC POSTMENOPAUSAL PATIENT: ICD-10-CM

## 2022-10-07 DIAGNOSIS — M62.830 BACK SPASM: ICD-10-CM

## 2022-10-07 DIAGNOSIS — Z13.820 ENCOUNTER FOR OSTEOPOROSIS SCREENING IN ASYMPTOMATIC POSTMENOPAUSAL PATIENT: ICD-10-CM

## 2022-10-07 DIAGNOSIS — R63.6 UNDERWEIGHT ON EXAMINATION: ICD-10-CM

## 2022-10-07 DIAGNOSIS — R05.9 COUGH, UNSPECIFIED TYPE: Primary | ICD-10-CM

## 2022-10-07 DIAGNOSIS — Z00.00 ENCOUNTER FOR ANNUAL PHYSICAL EXAMINATION EXCLUDING GYNECOLOGICAL EXAMINATION IN A PATIENT OLDER THAN 17 YEARS: ICD-10-CM

## 2022-10-07 DIAGNOSIS — K21.00 GASTROESOPHAGEAL REFLUX DISEASE WITH ESOPHAGITIS WITHOUT HEMORRHAGE: ICD-10-CM

## 2022-10-07 DIAGNOSIS — F41.9 ANXIETY: ICD-10-CM

## 2022-10-07 DIAGNOSIS — Z78.9 LANGUAGE BARRIER AFFECTING HEALTH CARE: ICD-10-CM

## 2022-10-07 DIAGNOSIS — Z11.59 ENCOUNTER FOR HEPATITIS C SCREENING TEST FOR LOW RISK PATIENT: ICD-10-CM

## 2022-10-07 PROCEDURE — 99396 PREV VISIT EST AGE 40-64: CPT | Performed by: NURSE PRACTITIONER

## 2022-10-07 RX ORDER — METHOCARBAMOL 500 MG/1
500 TABLET, FILM COATED ORAL 4 TIMES DAILY PRN
Qty: 40 TABLET | Refills: 0 | Status: SHIPPED | OUTPATIENT
Start: 2022-10-07 | End: 2022-10-10

## 2022-10-07 RX ORDER — ESCITALOPRAM OXALATE 5 MG/1
5 TABLET ORAL DAILY
Qty: 90 TABLET | Refills: 1 | Status: SHIPPED | OUTPATIENT
Start: 2022-10-07 | End: 2022-10-10

## 2022-10-07 RX ORDER — PANTOPRAZOLE SODIUM 40 MG/1
40 TABLET, DELAYED RELEASE ORAL DAILY
Qty: 90 TABLET | Refills: 1 | Status: SHIPPED | OUTPATIENT
Start: 2022-10-07 | End: 2022-12-23

## 2022-10-07 RX ORDER — IBANDRONATE SODIUM 150 MG/1
150 TABLET, FILM COATED ORAL
Qty: 12 TABLET | Refills: 0 | Status: SHIPPED | OUTPATIENT
Start: 2022-10-07 | End: 2022-10-07 | Stop reason: SDUPTHER

## 2022-10-07 RX ORDER — MELATONIN
1000 DAILY
Qty: 90 TABLET | Refills: 3 | Status: SHIPPED | OUTPATIENT
Start: 2022-10-07

## 2022-10-07 RX ORDER — IBANDRONATE SODIUM 150 MG/1
150 TABLET, FILM COATED ORAL
Qty: 12 TABLET | Refills: 0 | Status: SHIPPED | OUTPATIENT
Start: 2022-10-07 | End: 2022-10-10

## 2022-10-07 RX ORDER — PRASTERONE 6.5 MG/1
1 INSERT VAGINAL DAILY
Qty: 90 EACH | Refills: 3 | Status: SHIPPED | OUTPATIENT
Start: 2022-10-07 | End: 2022-10-10

## 2022-10-07 RX ORDER — DEXTROMETHORPHAN HYDROBROMIDE AND PROMETHAZINE HYDROCHLORIDE 15; 6.25 MG/5ML; MG/5ML
5 SYRUP ORAL 4 TIMES DAILY PRN
Qty: 240 ML | Refills: 0 | Status: SHIPPED | OUTPATIENT
Start: 2022-10-07 | End: 2022-10-10

## 2022-10-07 NOTE — PROGRESS NOTES
Chief Complaint  Annual Exam    Subjective           Tiny Turcios presents to [facility]  History of Present Illness  Patient is a 58 yo female. She speaks Barbadian - using . [id number] Dill[place]     Patient presents for follow-up on chronic medical problems including GERD, osteroporosis, underweight,  and anxiety. Patient states she has been on any medication in the past for her heartburn, and anxiety.She states she has not been taking her medication the refills have been out and her heartburn/gastritis has worsened.   She states her anxiety is under control.      Patient complains of cough. Patient is here for monitoring of chronic issues due to need for monitoring of renal function, liver function, blood pressure effects of meds and side effects     Patient is complaining of a cough, nonproductive, is worse at night. She has tested negative at home for COVID.  She denies fever, chills, nausea, or vomiting.   She is concerned with her weight. She is BMI of 18. Discussed increasing caloric intake. Eating small but frequent meals. Discussed referral to dietician.       Dental exam: up to date 04/2022  Eye exam: no glasses or contacts - up to date   Mammogram up to date 06/2022  Exercises frequently, eats a healthy diet.                 The following portions of the patient's history were reviewed and updated as appropriate: allergies, current medications, past family history, past medical history, past social history, past surgical history and problem list.    Review of Systems   HENT: Negative.    Respiratory: Positive for cough.    Gastrointestinal:        Gastritis - not taking PPI    Genitourinary: Negative.    Musculoskeletal: Negative.    Skin: Negative.    Allergic/Immunologic: Negative.    Neurological: Negative.    Hematological: Negative.    Psychiatric/Behavioral: Negative.          Objective   Vital Signs:   /70   Pulse 75   Temp 98 °F  "(36.7 °C) (Temporal)   Resp 21   Ht 162.6 cm (64.02\")   Wt 47.9 kg (105 lb 9.6 oz)   SpO2 99%   BMI 18.12 kg/m²    BMI is below normal parameters (malnutrition). Recommendations: referral to dietitian      PHQ-2/9 Depression Screening  PHQ-9 Total Score: 0    HARRISON-7 Anxiety Screening  HARRISON-7  Feeling nervous, anxious or on edge: Several days  Not being able to stop or control worrying: Not at all  Worrying too much about different things: Not at all  Trouble Relaxing: Not at all  Being so restless that it is hard to sit still: Not at all  Feeling afraid as if something awful might happen: Not at all  Becoming easily annoyed or irritable: Not at all  HARRISON 7 Total Score: 1  If you checked any problems, how difficult have these problems made it for you to do your work, take care of things at home, or get along with other people: Not difficult at all          Physical Exam  Vitals reviewed.   Constitutional:       Appearance: She is well-developed. She is not ill-appearing.   HENT:      Head: Normocephalic.      Right Ear: Tympanic membrane, ear canal and external ear normal.      Left Ear: Tympanic membrane, ear canal and external ear normal.      Nose: Nose normal.      Mouth/Throat:      Mouth: Mucous membranes are dry.   Eyes:      Conjunctiva/sclera: Conjunctivae normal.      Pupils: Pupils are equal, round, and reactive to light.   Cardiovascular:      Rate and Rhythm: Normal rate and regular rhythm.      Heart sounds: Normal heart sounds.   Pulmonary:      Effort: Pulmonary effort is normal.      Breath sounds: Normal breath sounds.   Abdominal:      General: Bowel sounds are normal.      Palpations: Abdomen is soft.   Musculoskeletal:         General: Normal range of motion.      Cervical back: Normal range of motion.   Lymphadenopathy:      Cervical: No cervical adenopathy.   Skin:     General: Skin is warm and dry.      Capillary Refill: Capillary refill takes less than 2 seconds.   Neurological:      Mental " Status: She is alert and oriented to person, place, and time.   Psychiatric:         Mood and Affect: Mood normal.         Speech: Speech normal.         Behavior: Behavior normal. Behavior is cooperative.         Thought Content: Thought content normal.         Judgment: Judgment normal.        Result Review :                 Assessment and Plan    Diagnoses and all orders for this visit:    1. Cough, unspecified type (Primary)  -     promethazine-dextromethorphan (PROMETHAZINE-DM) 6.25-15 MG/5ML syrup; Take 5 mL by mouth 4 (Four) Times a Day As Needed for Cough.  Dispense: 240 mL; Refill: 0    2. Encounter for osteoporosis screening in asymptomatic postmenopausal patient  -     DEXA Bone Density Axial; Future    3. Encounter for annual physical examination excluding gynecological examination in a patient older than 17 years  -     Comprehensive Metabolic Panel; Future  -     CBC & Differential; Future  -     Hemoglobin A1c; Future  -     Lipid Panel; Future  -     Vitamin D 25 Hydroxy; Future  -     Urinalysis With Culture If Indicated - Urine, Clean Catch; Future  -     TSH; Future    4. Immunization due  -     Cancel: Shingrix Vaccine    5. Encounter for hepatitis C screening test for low risk patient  -     Hepatitis C Antibody; Future    6. Gastroesophageal reflux disease with esophagitis without hemorrhage  -     pantoprazole (Protonix) 40 MG EC tablet; Take 1 tablet by mouth Daily. For heartburn/gerd  Dispense: 90 tablet; Refill: 1    7. Back spasm  -     methocarbamol (Robaxin) 500 MG tablet; Take 1 tablet by mouth 4 (Four) Times a Day As Needed for Muscle Spasms.  Dispense: 40 tablet; Refill: 0    8. Anxiety  -     escitalopram (Lexapro) 5 MG tablet; Take 1 tablet by mouth Daily. For anxiety  Dispense: 90 tablet; Refill: 1    9. Underweight on examination  -     Ambulatory Referral to Nutrition Services    10. Language barrier affecting health care    Other orders  -     Discontinue: ibandronate (Boniva)  150 MG tablet; Take 1 tablet by mouth Every 30 (Thirty) Days. Take 60 minutes before first morning food, drink or other medication.  Avoid lying down for 60 minutes after dose administered.  Dispense: 12 tablet; Refill: 0  -     Prasterone (Intrarosa) 6.5 MG insert; Insert 1 suppository into the vagina Daily. For dyspareunia  Dispense: 90 each; Refill: 3  -     cholecalciferol (VITAMIN D3) 25 MCG (1000 UT) tablet; Take 1 tablet by mouth Daily.  Dispense: 90 tablet; Refill: 3  -     ibandronate (Boniva) 150 MG tablet; Take 1 tablet by mouth Every 30 (Thirty) Days. Take 60 minutes before first morning food, drink or other medication. Avoid lying down for 60 minutes after dose administered.  Dispense: 12 tablet; Refill: 0    Patient Counseling:  --Nutrition: Stressed importance of moderation in sodium/caffeine intake, saturated fat and cholesterol, caloric balance, sufficient intake of fresh fruits, vegetables, fiber, calcium, iron--Exercise: Stressed the importance of regular exercise.   --Substance Abuse: Discussed cessation/primary prevention of tobacco, alcohol, or other drug use; driving or other dangerous activities under the influence; availability of treatment for abuse.    --Sexuality: Discussed sexually transmitted diseases, partner selection, use of condoms, avoidance of unintended pregnancy and contraceptive alternatives.   --Injury prevention: Discussed safety belts, safety helmets, smoke detector, smoking near bedding or upholstery.   --Dental health: Discussed importance of regular tooth brushing, flossing, and dental visits.  --Immunizations reviewed. Updated.   --Discussed benefits of screening colonoscopy.  --After hours’ service discussed with patient     Discussed the nature of the medical condition(s) risks, complications, implications, and management, safe and proper use of medications. Encouraged medication compliance, and keeping scheduled follow up appointments with me and any other providers.       Checking labs. Refilling medications.   Follow up with PCP in 6 months and as needed.       Follow Up   Return in about 6 months (around 4/7/2023), or with ABDIEL Stewart for merly.  Patient was given instructions and counseling regarding her condition or for health maintenance advice. Please see specific information pulled into the AVS if appropriate.

## 2022-10-10 ENCOUNTER — OFFICE VISIT (OUTPATIENT)
Dept: CARDIOLOGY | Facility: CLINIC | Age: 57
End: 2022-10-10

## 2022-10-10 ENCOUNTER — LAB (OUTPATIENT)
Dept: LAB | Facility: HOSPITAL | Age: 57
End: 2022-10-10

## 2022-10-10 VITALS
DIASTOLIC BLOOD PRESSURE: 60 MMHG | HEIGHT: 65 IN | WEIGHT: 108 LBS | HEART RATE: 66 BPM | SYSTOLIC BLOOD PRESSURE: 110 MMHG | BODY MASS INDEX: 17.99 KG/M2 | OXYGEN SATURATION: 99 %

## 2022-10-10 DIAGNOSIS — R00.2 PALPITATIONS: Primary | ICD-10-CM

## 2022-10-10 DIAGNOSIS — Z00.00 ENCOUNTER FOR ANNUAL PHYSICAL EXAMINATION EXCLUDING GYNECOLOGICAL EXAMINATION IN A PATIENT OLDER THAN 17 YEARS: ICD-10-CM

## 2022-10-10 DIAGNOSIS — Z11.59 ENCOUNTER FOR HEPATITIS C SCREENING TEST FOR LOW RISK PATIENT: ICD-10-CM

## 2022-10-10 LAB
25(OH)D3 SERPL-MCNC: 43.8 NG/ML (ref 30–100)
ALBUMIN SERPL-MCNC: 4.8 G/DL (ref 3.5–5.2)
ALBUMIN/GLOB SERPL: 2.2 G/DL
ALP SERPL-CCNC: 68 U/L (ref 39–117)
ALT SERPL W P-5'-P-CCNC: 16 U/L (ref 1–33)
ANION GAP SERPL CALCULATED.3IONS-SCNC: 10.1 MMOL/L (ref 5–15)
AST SERPL-CCNC: 22 U/L (ref 1–32)
BACTERIA UR QL AUTO: NORMAL /HPF
BASOPHILS # BLD AUTO: 0.03 10*3/MM3 (ref 0–0.2)
BASOPHILS NFR BLD AUTO: 0.6 % (ref 0–1.5)
BILIRUB SERPL-MCNC: 0.4 MG/DL (ref 0–1.2)
BILIRUB UR QL STRIP: NEGATIVE
BUN SERPL-MCNC: 18 MG/DL (ref 6–20)
BUN/CREAT SERPL: 22.2 (ref 7–25)
CALCIUM SPEC-SCNC: 9.4 MG/DL (ref 8.6–10.5)
CHLORIDE SERPL-SCNC: 103 MMOL/L (ref 98–107)
CHOLEST SERPL-MCNC: 199 MG/DL (ref 0–200)
CLARITY UR: CLEAR
CO2 SERPL-SCNC: 25.9 MMOL/L (ref 22–29)
COLOR UR: YELLOW
CREAT SERPL-MCNC: 0.81 MG/DL (ref 0.57–1)
DEPRECATED RDW RBC AUTO: 36.4 FL (ref 37–54)
EGFRCR SERPLBLD CKD-EPI 2021: 84.8 ML/MIN/1.73
EOSINOPHIL # BLD AUTO: 0.02 10*3/MM3 (ref 0–0.4)
EOSINOPHIL NFR BLD AUTO: 0.4 % (ref 0.3–6.2)
ERYTHROCYTE [DISTWIDTH] IN BLOOD BY AUTOMATED COUNT: 11.8 % (ref 12.3–15.4)
GLOBULIN UR ELPH-MCNC: 2.2 GM/DL
GLUCOSE SERPL-MCNC: 97 MG/DL (ref 65–99)
GLUCOSE UR STRIP-MCNC: NEGATIVE MG/DL
HBA1C MFR BLD: 5.2 % (ref 4.8–5.6)
HCT VFR BLD AUTO: 42 % (ref 34–46.6)
HCV AB SER DONR QL: NORMAL
HDLC SERPL-MCNC: 66 MG/DL (ref 40–60)
HGB BLD-MCNC: 13.5 G/DL (ref 12–15.9)
HGB UR QL STRIP.AUTO: ABNORMAL
HYALINE CASTS UR QL AUTO: NORMAL /LPF
IMM GRANULOCYTES # BLD AUTO: 0.01 10*3/MM3 (ref 0–0.05)
IMM GRANULOCYTES NFR BLD AUTO: 0.2 % (ref 0–0.5)
KETONES UR QL STRIP: NEGATIVE
LDLC SERPL CALC-MCNC: 125 MG/DL (ref 0–100)
LDLC/HDLC SERPL: 1.89 {RATIO}
LEUKOCYTE ESTERASE UR QL STRIP.AUTO: NEGATIVE
LYMPHOCYTES # BLD AUTO: 1.49 10*3/MM3 (ref 0.7–3.1)
LYMPHOCYTES NFR BLD AUTO: 27.8 % (ref 19.6–45.3)
MCH RBC QN AUTO: 27.4 PG (ref 26.6–33)
MCHC RBC AUTO-ENTMCNC: 32.1 G/DL (ref 31.5–35.7)
MCV RBC AUTO: 85.2 FL (ref 79–97)
MONOCYTES # BLD AUTO: 0.42 10*3/MM3 (ref 0.1–0.9)
MONOCYTES NFR BLD AUTO: 7.8 % (ref 5–12)
NEUTROPHILS NFR BLD AUTO: 3.39 10*3/MM3 (ref 1.7–7)
NEUTROPHILS NFR BLD AUTO: 63.2 % (ref 42.7–76)
NITRITE UR QL STRIP: NEGATIVE
NRBC BLD AUTO-RTO: 0 /100 WBC (ref 0–0.2)
PH UR STRIP.AUTO: 6 [PH] (ref 5–8)
PLATELET # BLD AUTO: 275 10*3/MM3 (ref 140–450)
PMV BLD AUTO: 10.6 FL (ref 6–12)
POTASSIUM SERPL-SCNC: 4.5 MMOL/L (ref 3.5–5.2)
PROT SERPL-MCNC: 7 G/DL (ref 6–8.5)
PROT UR QL STRIP: NEGATIVE
RBC # BLD AUTO: 4.93 10*6/MM3 (ref 3.77–5.28)
RBC # UR STRIP: NORMAL /HPF
REF LAB TEST METHOD: NORMAL
SODIUM SERPL-SCNC: 139 MMOL/L (ref 136–145)
SP GR UR STRIP: 1.02 (ref 1–1.03)
SQUAMOUS #/AREA URNS HPF: NORMAL /HPF
TRIGL SERPL-MCNC: 40 MG/DL (ref 0–150)
TSH SERPL DL<=0.05 MIU/L-ACNC: 1.94 UIU/ML (ref 0.27–4.2)
UROBILINOGEN UR QL STRIP: ABNORMAL
VLDLC SERPL-MCNC: 8 MG/DL (ref 5–40)
WBC # UR STRIP: NORMAL /HPF
WBC NRBC COR # BLD: 5.36 10*3/MM3 (ref 3.4–10.8)

## 2022-10-10 PROCEDURE — 86803 HEPATITIS C AB TEST: CPT

## 2022-10-10 PROCEDURE — 80061 LIPID PANEL: CPT

## 2022-10-10 PROCEDURE — 81001 URINALYSIS AUTO W/SCOPE: CPT

## 2022-10-10 PROCEDURE — 83036 HEMOGLOBIN GLYCOSYLATED A1C: CPT

## 2022-10-10 PROCEDURE — 93000 ELECTROCARDIOGRAM COMPLETE: CPT | Performed by: PHYSICIAN ASSISTANT

## 2022-10-10 PROCEDURE — 99213 OFFICE O/P EST LOW 20 MIN: CPT | Performed by: PHYSICIAN ASSISTANT

## 2022-10-10 PROCEDURE — 82306 VITAMIN D 25 HYDROXY: CPT

## 2022-10-10 PROCEDURE — 80050 GENERAL HEALTH PANEL: CPT

## 2022-10-10 NOTE — PROGRESS NOTES
"[facility]  INITIAL OFFICE CONSULT      Tiny Turcios  1965  PCP: Rochelle Stewart PA    SUBJECTIVE:   Tiny Turcios is a 57 y.o. female seen for a consultation visit regarding the following:     Chief Complaint:   Chief Complaint   Patient presents with   • Palpitations   • Rapid Heart Rate   • Hypertension          Consultation is requested by AGUEDA Louie for evaluation of Palpitations, Rapid Heart Rate, and Hypertension        History:  Pleasant 57-year-old female originally from [facility] working here in [place] in [place] with  helping expand community obtain care.  Patient does not speak English but there is a  here today who speak Malay to help answer questions.  She states that about a month ago she had an episode of tach palpitations that occurred randomly while she was walking.  She states her heart rate \"increase quickly\" and last about 5 minutes and resolved on its own.  She also has occasional skipped beats.  She denies any chest pain or chest tightness testing angina pectoris.  She denies dizziness near syncope or CV denies any heart failure symptoms.  She states been quite anxious ever since this episode occurred as it scared her.  She is never had a thing like this in the past.  The other health complaint she has had issues with gastritis, reflux type symptoms and she is supposed to take Protonix for this therapy.  She is cut back her caffeine and she does not smoke.  She does today for further [facility] evaluation      Cardiac PMH: (Old records have been reviewed and summarized below)  1. Palplitations  2. Gastritis  3. Osteoporosis  4. Anxiety  5. Weight loss loss      Past Medical History, Past Surgical History, Family history, Social History, and Medications were all reviewed with the patient today and updated as necessary.     Current Outpatient Medications   Medication Sig Dispense Refill "   • cholecalciferol (VITAMIN D3) 25 MCG (1000 UT) tablet Take 1 tablet by mouth Daily. 90 tablet 3   • estradiol (ESTRACE VAGINAL) 0.1 MG/GM vaginal cream Insert 1 gm intravaginally 3 times each week for 3 weeks and then weekly thereafter. (Patient taking differently: As Needed. Insert 1 gm intravaginally 3 times each week for 3 weeks and then weekly thereafter.) 1 each 3   • NON FORMULARY Chocolate with magnesium and potassium in it.     • pantoprazole (Protonix) 40 MG EC tablet Take 1 tablet by mouth Daily. For heartburn/gerd (Patient taking differently: Take 1 tablet by mouth As Needed. For heartburn/gerd) 90 tablet 1     No current facility-administered medications for this visit.     No Known Allergies      Past Medical History:   Diagnosis Date   • Anxiety    • Arthritis     arthritis/unspecified   • Breast mass, right 2021   • Bursitis    • Frequent UTI    • HSV infection    • Hypercholesterolemia    • Migraine    • Palpitations    • Renal calculi      Past Surgical History:   Procedure Laterality Date   •  SECTION     • COLONOSCOPY     • KIDNEY STONE SURGERY      lithotripsy     Family History   Problem Relation Age of Onset   • Osteoporosis Maternal Uncle    • Stomach cancer Other    • Diabetes Other    • Hyperlipidemia Other    • Heart disease Other    • Osteoporosis Other    • Breast cancer Neg Hx    • Ovarian cancer Neg Hx      Social History     Tobacco Use   • Smoking status: Former     Types: Cigarettes   • Smokeless tobacco: Never   • Tobacco comments:     Smoked as a child   Substance Use Topics   • Alcohol use: Yes     Alcohol/week: 1.0 standard drink     Types: 1 Glasses of wine per week     Comment: ocass       ROS:  Review of Symptoms:  General: no recent weight loss/gain, weakness or fatigue  Skin: no rashes, lumps, or other skin changes  HEENT: no dizziness, lightheadedness, or vision changes  Respiratory: no cough or hemoptysis  Cardiovascular: + palpitations, and  "tachycardia  Gastrointestinal:+ GERD  no black/tarry stools or diarrhea  Urinary: no change in frequency or urgency  Peripheral Vascular: no claudication or leg cramps  Musculoskeletal: no muscle or joint pain/stiffness  Psychiatric: + + Anxiety   Neurological: no sensory or motor loss, no syncope  Hematologic: no anemia, easy bruising or bleeding  Endocrine: no thyroid problems, nor heat or cold intolerance         PHYSICAL EXAM:   /60 (BP Location: Right arm, Patient Position: Sitting)   Pulse 66   Ht 165.1 cm (65\")   Wt 49 kg (108 lb)   LMP  (LMP Unknown)   SpO2 99%   BMI 17.97 kg/m²      Wt Readings from Last 5 Encounters:   10/10/22 49 kg (108 lb)   10/07/22 47.9 kg (105 lb 9.6 oz)   10/05/22 48.5 kg (107 lb)   09/28/22 48.4 kg (106 lb 9.6 oz)   06/30/22 49.9 kg (110 lb)     BP Readings from Last 5 Encounters:   10/10/22 110/60   10/07/22 106/70   10/05/22 110/72   09/28/22 117/74   06/30/22 104/60       General-Well Nourished, Well developed  Eyes - PERRLA  Neck- supple, No mass  CV- regular rate and rhythm, no MRG  Lung- clear bilaterally  Abd- soft, +BS  Musc/skel - Norm strength and range of motion  Skin- warm and dry  Neuro - Alert & Oriented x 3, appropriate mood.    Patient's external notes were reviewed.  Independent interpretation of test performed by another physician in facility were reviewed.  Outside laboratory data was also reviewed.    Medical problems and test results were reviewed with the patient today.       EKG:  (EKG/Tracing has been independently visualized by me and summarized below)      ECG 12 Lead    Date/Time: 10/10/2022 8:36 AM  Performed by: Clayton Moore PA  Authorized by: Clayton Moore PA   Rhythm: sinus rhythm  Ectopy: unifocal PVCs  Rate: normal  Conduction: conduction normal  ST Segments: ST segments normal  T Waves: T waves normal  QRS axis: normal  Other: no other findings    Clinical impression: abnormal EKG            ASSESSMENT   1.  Palpitations: " Possible SVT, EKG today reels PVCs.  We will take an 2-week monitor today.  2.  Recent anxiety, weight loss nervousness she needs labs today to rule out thyroid disease and other basic labs like CBC and BMP today.  3.  Gastritis, GERD symptoms will start proton pump inhibitor      PLAN  · 2-week monitor  · Echocardiogram  · Routine labs today to rule out thyroid disease, anemia, kidney disease  · Start PPI for gastritis, GERD  · Return follow-up her office in 6 weeks or sooner as needed          Cardiology/Electrophysiology  10/10/22  08:35 EDT  Will Oscar CABALLERO   Awake/Alert

## 2022-10-26 ENCOUNTER — HOSPITAL ENCOUNTER (OUTPATIENT)
Dept: CARDIOLOGY | Facility: HOSPITAL | Age: 57
Discharge: HOME | End: 2022-10-26
Admitting: PHYSICIAN ASSISTANT

## 2022-10-26 VITALS — WEIGHT: 108.03 LBS | BODY MASS INDEX: 18 KG/M2 | HEIGHT: 65 IN

## 2022-10-26 DIAGNOSIS — R00.2 PALPITATIONS: ICD-10-CM

## 2022-10-26 LAB
BH CV ECHO MEAS - AO MAX PG: 4.2 MMHG
BH CV ECHO MEAS - AO MEAN PG: 2.5 MMHG
BH CV ECHO MEAS - AO ROOT DIAM: 3.1 CM
BH CV ECHO MEAS - AO V2 MAX: 102.4 CM/SEC
BH CV ECHO MEAS - AO V2 VTI: 24.7 CM
BH CV ECHO MEAS - AVA(I,D): 1.69 CM2
BH CV ECHO MEAS - EDV(CUBED): 99.4 ML
BH CV ECHO MEAS - EDV(MOD-SP2): 70.2 ML
BH CV ECHO MEAS - EDV(MOD-SP4): 72.8 ML
BH CV ECHO MEAS - EF(MOD-BP): 50.3 %
BH CV ECHO MEAS - EF(MOD-SP2): 45 %
BH CV ECHO MEAS - EF(MOD-SP4): 56.7 %
BH CV ECHO MEAS - EF_3D-VOL: 52 %
BH CV ECHO MEAS - ESV(CUBED): 22.9 ML
BH CV ECHO MEAS - ESV(MOD-SP2): 38.6 ML
BH CV ECHO MEAS - ESV(MOD-SP4): 31.5 ML
BH CV ECHO MEAS - FS: 38.7 %
BH CV ECHO MEAS - IVS/LVPW: 0.9 CM
BH CV ECHO MEAS - IVSD: 0.68 CM
BH CV ECHO MEAS - LA DIMENSION: 3.6 CM
BH CV ECHO MEAS - LAT PEAK E' VEL: 11.2 CM/SEC
BH CV ECHO MEAS - LV MASS(C)D: 103.2 GRAMS
BH CV ECHO MEAS - LV MAX PG: 1.32 MMHG
BH CV ECHO MEAS - LV MEAN PG: 0.8 MMHG
BH CV ECHO MEAS - LV V1 MAX: 57.4 CM/SEC
BH CV ECHO MEAS - LV V1 VTI: 13.3 CM
BH CV ECHO MEAS - LVIDD: 4.6 CM
BH CV ECHO MEAS - LVIDS: 2.8 CM
BH CV ECHO MEAS - LVOT AREA: 3.1 CM2
BH CV ECHO MEAS - LVOT DIAM: 2 CM
BH CV ECHO MEAS - LVPWD: 0.75 CM
BH CV ECHO MEAS - MED PEAK E' VEL: 8.8 CM/SEC
BH CV ECHO MEAS - MV A MAX VEL: 66 CM/SEC
BH CV ECHO MEAS - MV DEC SLOPE: 556.7 CM/SEC2
BH CV ECHO MEAS - MV DEC TIME: 0.18 MSEC
BH CV ECHO MEAS - MV E MAX VEL: 73.7 CM/SEC
BH CV ECHO MEAS - MV E/A: 1.12
BH CV ECHO MEAS - MV MAX PG: 2.8 MMHG
BH CV ECHO MEAS - MV MEAN PG: 1.54 MMHG
BH CV ECHO MEAS - MV P1/2T: 46.4 MSEC
BH CV ECHO MEAS - MV V2 VTI: 23.4 CM
BH CV ECHO MEAS - MVA(P1/2T): 4.7 CM2
BH CV ECHO MEAS - MVA(VTI): 1.79 CM2
BH CV ECHO MEAS - PA ACC TIME: 0.14 SEC
BH CV ECHO MEAS - PA PR(ACCEL): 15.6 MMHG
BH CV ECHO MEAS - PA V2 MAX: 64 CM/SEC
BH CV ECHO MEAS - RAP SYSTOLE: 3 MMHG
BH CV ECHO MEAS - RVSP: 14 MMHG
BH CV ECHO MEAS - SV(LVOT): 41.8 ML
BH CV ECHO MEAS - SV(MOD-SP2): 31.6 ML
BH CV ECHO MEAS - SV(MOD-SP4): 41.3 ML
BH CV ECHO MEAS - TAPSE (>1.6): 2.08 CM
BH CV ECHO MEAS - TR MAX PG: 11 MMHG
BH CV ECHO MEAS - TR MAX VEL: 163.5 CM/SEC
BH CV ECHO MEASUREMENTS AVERAGE E/E' RATIO: 7.37
BH CV VAS BP RIGHT ARM: NORMAL MMHG
BH CV XLRA - RV BASE: 3.2 CM
BH CV XLRA - RV LENGTH: 6.1 CM
BH CV XLRA - RV MID: 2.08 CM
BH CV XLRA - TDI S': 10 CM/SEC
LEFT ATRIUM VOLUME INDEX: 20.1 ML/M2
MAXIMAL PREDICTED HEART RATE: 163 BPM
STRESS TARGET HR: 139 BPM

## 2022-10-26 PROCEDURE — 93306 TTE W/DOPPLER COMPLETE: CPT

## 2022-10-26 PROCEDURE — 93306 TTE W/DOPPLER COMPLETE: CPT | Performed by: INTERNAL MEDICINE

## 2022-10-27 ENCOUNTER — APPOINTMENT (OUTPATIENT)
Dept: BONE DENSITY | Facility: HOSPITAL | Age: 57
End: 2022-10-27

## 2022-11-03 ENCOUNTER — TELEPHONE (OUTPATIENT)
Dept: CARDIOLOGY | Facility: CLINIC | Age: 57
End: 2022-11-03

## 2022-11-03 NOTE — TELEPHONE ENCOUNTER
I called patient with an  and updated her on these results. She voiced understanding. She states that she is feeling well overall. She wished to move follow up appointment to next week d/t scheduling issues. I transferred her to scheduling to set this up.

## 2022-11-10 ENCOUNTER — OFFICE VISIT (OUTPATIENT)
Dept: CARDIOLOGY | Facility: CLINIC | Age: 57
End: 2022-11-10

## 2022-11-10 VITALS
OXYGEN SATURATION: 99 % | HEIGHT: 65 IN | HEART RATE: 78 BPM | SYSTOLIC BLOOD PRESSURE: 104 MMHG | BODY MASS INDEX: 18.06 KG/M2 | WEIGHT: 108.4 LBS | DIASTOLIC BLOOD PRESSURE: 60 MMHG

## 2022-11-10 DIAGNOSIS — R00.2 PALPITATIONS: Primary | ICD-10-CM

## 2022-11-10 PROCEDURE — 99214 OFFICE O/P EST MOD 30 MIN: CPT | Performed by: PHYSICIAN ASSISTANT

## 2022-11-10 PROCEDURE — 93000 ELECTROCARDIOGRAM COMPLETE: CPT | Performed by: PHYSICIAN ASSISTANT

## 2022-11-10 RX ORDER — PROPRANOLOL HYDROCHLORIDE 10 MG/1
10 TABLET ORAL AS NEEDED
Qty: 30 TABLET | Refills: 1 | Status: SHIPPED | OUTPATIENT
Start: 2022-11-10 | End: 2022-11-29 | Stop reason: SDUPTHER

## 2022-11-10 NOTE — PROGRESS NOTES
[facility]   OFFICE NOTE      Tiny Turcios  1965  PCP: Rochelle Stewart PA    SUBJECTIVE:   Tiny Turcios is a 57 y.o. female seen for a follow up visit regarding the following:     CC: Palpitations    HPI:   Pleasant 57-year-old female returns today for follow-up regarding palpitations.  She states she is have a lot of anxiety for life.  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  She occasionally feels palpitation she has good days and bad days.  Most days are good but occasionally she will the day that is bad with extra beats on a regular basis with occasional fast skipped beats.  She has not had chest pain or shortness of breath or dizziness or near syncope.  She is a little nervous recently as her daughter is pregnant and is going to visit her this spring in [place].    Cardiac PMH: (Old records have been reviewed and summarized below)  1. Palpitations  1. Echocardiogram October 26, 2022 EF 60% normal RVSP  otherwise no significant valvular heart disease normal.  2. ZIO monitor October 10, 2022.  7.4% PVC burden single triggered event associated PVC.  Short runs of atrial tachycardia without symptoms     2. Gastritis  3. Osteoporosis  4. Anxiety  5. Weight loss loss  6. Marginal blood pressure          Past Medical History, Past Surgical History, Family history, Social History, and Medications were all reviewed with the patient today and updated as necessary.       Current Outpatient Medications:   •  cholecalciferol (VITAMIN D3) 25 MCG (1000 UT) tablet, Take 1 tablet by mouth Daily., Disp: 90 tablet, Rfl: 3  •  estradiol (ESTRACE VAGINAL) 0.1 MG/GM vaginal cream, Insert 1 gm intravaginally 3 times each week for 3 weeks and then weekly thereafter. (Patient taking differently: As Needed. Insert 1 gm intravaginally 3 times each week for 3 weeks and then weekly thereafter.), Disp: 1 each, Rfl: 3  •  Magnesium 100 MG tablet,  "orally once a day, Disp: , Rfl:   •  NON FORMULARY, Chocolate with magnesium and potassium in it., Disp: , Rfl:   •  pantoprazole (Protonix) 40 MG EC tablet, Take 1 tablet by mouth Daily. For heartburn/gerd (Patient taking differently: Take 1 tablet by mouth As Needed. For heartburn/gerd), Disp: 90 tablet, Rfl: 1      Allergies   Allergen Reactions   • Statins Other (See Comments)     Stomach Discomfort         PHYSICAL EXAM:    /60 (BP Location: Right arm, Patient Position: Sitting)   Pulse 78   Ht 165.1 cm (65\")   Wt 49.2 kg (108 lb 6.4 oz)   LMP  (LMP Unknown)   SpO2 99%   BMI 18.04 kg/m²        Wt Readings from Last 5 Encounters:   11/10/22 49.2 kg (108 lb 6.4 oz)   10/26/22 49 kg (108 lb 0.4 oz)   10/10/22 49 kg (108 lb)   10/07/22 47.9 kg (105 lb 9.6 oz)   10/05/22 48.5 kg (107 lb)       BP Readings from Last 5 Encounters:   11/10/22 104/60   10/10/22 110/60   10/07/22 106/70   10/05/22 110/72   09/28/22 117/74       General appearance - Alert, well appearing, and in no distress   Mental status - Affect appropriate to mood.  Eyes - Sclerae anicteric,  ENMT - Hearing grossly normal bilaterally, Dental hygiene good.  Neck - Carotids upstroke normal bilaterally, no bruits, no JVD.  Resp - Clear to auscultation, no wheezes, rales or rhonchi, symmetric air entry.  Heart - Normal rate, regular rhythm, normal S1, S2, no murmurs, rubs, clicks or gallops.  GI - Soft, nontender, nondistended, no masses or organomegaly.  Neurological - Grossly intact - normal speech, no focal findings  Musculoskeletal - No joint tenderness, deformity or swelling, no muscular tenderness noted.  Extremities - Peripheral pulses normal, no pedal edema, no clubbing or cyanosis.  Skin - Normal coloration and turgor.  Psych -  oriented to person, place, and time.    Medical problems and test results were reviewed with the patient today.         EKG: (EKG has been independently visualized by me and summarized below)    ECG 12 " Lead    Date/Time: 11/10/2022 12:47 PM  Performed by: Clayton Moore PA  Authorized by: Clayton Moore PA   Comparison: compared with previous ECG from 10/10/2022  Rhythm: sinus rhythm  Rate: normal  Conduction: conduction normal  ST Segments: ST segments normal  T Waves: T waves normal  QRS axis: normal    Clinical impression: normal ECG              ASSESSMENT   1.  Palpitations: Monitor reveals 7% PVCs occasional brief atrial tachycardia.  We offered her the option taking daily medication however this may be challenging as she has marginal blood pressure may not tolerate medicines.  She would like to try as needed propanolol therapy as she does have some good days and bad days.  She will use propranolol 10 mg as needed for palpitations per day.  2.  Normal echocardiogram normal LV function  3.  Marginal blood pressure: Hydration fluids Gatorade Powerade exercise and regular basis.  4.  Anxiety, she would like to discuss further with her primary care provider    PLAN  Propanolol 10 mg daily as needed palpitations  Return follow-up her offspring 2023 or sooner as needed      11/10/2022  09:43 EST  Will Oscar CABALLERO

## 2022-11-14 ENCOUNTER — TELEPHONE (OUTPATIENT)
Dept: CARDIOLOGY | Facility: CLINIC | Age: 57
End: 2022-11-14

## 2022-11-14 NOTE — TELEPHONE ENCOUNTER
Patient called, she would like to know if there is another medication that you would recommend instead of Propranolol. She is concerned about taking this with her BP being labile.   I advised her that she would receive a call back.

## 2022-11-17 NOTE — TELEPHONE ENCOUNTER
I called patient and updated her on this, we spoke with along time. She is going to try PRN Propranolol and see if this helps. She will call the office with any questions or concerns.

## 2022-11-23 ENCOUNTER — TELEPHONE (OUTPATIENT)
Dept: FAMILY MEDICINE CLINIC | Facility: CLINIC | Age: 57
End: 2022-11-23

## 2022-11-23 NOTE — TELEPHONE ENCOUNTER
Caller: Tiny Turcios    Relationship: Self    Best call back number: [phone]    What is the best time to reach you: ANY TIME    Who are you requesting to speak with (clinical staff, provider,  specific staff member): NURSE    Do you know the name of the person who called: SELF    What was the call regarding: PATIENT HAS YET TO HEAR FROM REFERRAL FOR NUTRITION SERVICES SINCE 10/2022 AND PATIENT HAS LEFT SEVERAL MESSAGES AND NEVER RECEIVE A CALL BACK. PATIENT IS VERY FRUSTRATED AND WOULD LIKE TO SPEAK TO SOMEONE ABOUT GETTING AN APPOINTMENT WITH NUTRITIONIST SINCE SHE IS LOSING WEIGHT. PLEASE ADVISE AND CALL PATIENT BACK TODAY.    Do you require a callback: YES

## 2022-11-28 NOTE — TELEPHONE ENCOUNTER
Patient called and left a message stating that her BP becomes too low with Propranolol. She would like to know if there is anything else she could take.  Would it be ok to do 5mg of propranolol instead of 10mg?

## 2022-11-29 RX ORDER — PROPRANOLOL HYDROCHLORIDE 10 MG/1
5 TABLET ORAL DAILY PRN
Qty: 15 TABLET | Refills: 6 | OUTPATIENT
Start: 2022-11-29 | End: 2022-12-23

## 2022-11-29 NOTE — TELEPHONE ENCOUNTER
Patient notified and aware via . She requested that a prescription for Propranolol 5 mg be sent to [facility] pharmacy.

## 2022-11-30 ENCOUNTER — TELEPHONE (OUTPATIENT)
Dept: FAMILY MEDICINE CLINIC | Facility: CLINIC | Age: 57
End: 2022-11-30

## 2022-11-30 NOTE — TELEPHONE ENCOUNTER
Caller: Tiny Turcios    Relationship: Self    Best call back number: [phone]    What medications are you currently taking:   Current Outpatient Medications on File Prior to Visit   Medication Sig Dispense Refill   • cholecalciferol (VITAMIN D3) 25 MCG (1000 UT) tablet Take 1 tablet by mouth Daily. 90 tablet 3   • estradiol (ESTRACE VAGINAL) 0.1 MG/GM vaginal cream Insert 1 gm intravaginally 3 times each week for 3 weeks and then weekly thereafter. (Patient taking differently: As Needed. Insert 1 gm intravaginally 3 times each week for 3 weeks and then weekly thereafter.) 1 each 3   • Magnesium 100 MG tablet orally once a day     • NON FORMULARY Chocolate with magnesium and potassium in it.     • pantoprazole (Protonix) 40 MG EC tablet Take 1 tablet by mouth Daily. For heartburn/gerd (Patient taking differently: Take 1 tablet by mouth As Needed. For heartburn/gerd) 90 tablet 1   • propranolol (INDERAL) 10 MG tablet Take 0.5 tablets by mouth Daily As Needed (palpitations). 15 tablet 6     No current facility-administered medications on file prior to visit.      [address] - [phone]  - [phone]   [phone]    Which medication are you concerned about: propranolol (INDERAL) 10 MG tablet    Who prescribed you this medication: AGUEDA KIMBALL    What are your concerns: PATIENT SAYS THIS MEDICATION IS TOO STRONG AND HAS BEEN CAUSING LOW BLOOD PRESSURE. SHE HAS BEEN ADVISED BY HER CARDIOLOGIST TO STOP TAKING THIS MEDICATION. PATIENT WOULD LIKE TO KNOW IF THERE IS AN ALTERNATIVE MEDICATION TO HELP WITH STRESS AND ANXIETY THAT IS NOT AS STRONG.

## 2022-11-30 NOTE — TELEPHONE ENCOUNTER
Patient called back today. I spoke with her via . She states that she is anxious about having palpitations and worries about her BP getting too low. She requested a medication to help her relax. I explained to her that we do not prescribe those kinds of medications, but she could f/u with her PCP and they would be able to assist her. I told her that I would let her know if you wanted to change her treatment plan.

## 2022-12-02 NOTE — TELEPHONE ENCOUNTER
The lexapro I gave her should help with anxiety. The propranolol was just to use as needed, and it can cause low blood pressure, so just stay off that.

## 2022-12-08 ENCOUNTER — HOSPITAL ENCOUNTER (OUTPATIENT)
Dept: NUTRITION | Facility: HOSPITAL | Age: 57
Setting detail: RECURRING SERIES
Discharge: HOME | End: 2022-12-08

## 2022-12-08 PROCEDURE — 97802 MEDICAL NUTRITION INDIV IN: CPT

## 2022-12-08 NOTE — CONSULTS
"Adult Outpatient Nutrition  Assessment    Patient Name:  Tiny Turcios  YOB: 1965  MRN: [id number]    Assessment Date:  12/8/2022    Comments:      This medical referred consult was provided in-office. Consent for treatment was given verbally. RD spent a total of 60 minutes with patient today.      Reason for visit:     Patient is a 57 year old female with a h/o GERD, gastritis, and anxiety who is seen today for an initial nutrition evaluation related to low weight. Patient stated she is concerned for her lab work showing elevated cholesterol levels. She also stated she is nervous her blood sugar is high, even though she knows her Hgb A1c was WNL. Patient would like RD to make recommendations to help her gain weight, keep her blood sugar stable, and to promote healthy lipid levels.     Session Details:     Attendees: Patient and her   Language/communication details: Dominican is preferred spoken language and she can read written material in English  Barriers to learning: None  Health literacy: Adequate   Details of home: Patient lives with her      Anthropometrics:     Ht Readings from Last 1 Encounters:   11/10/22 165.1 cm (65\")      Wt Readings from Last 1 Encounters:   11/10/22 49.2 kg (108 lb 6.4 oz)      BMI Readings from Last 1 Encounters:   11/10/22 18.04 kg/m²        Pertinent Labs:     Lab Results   Component Value Date    CHOL 199 10/10/2022    TRIG 40 10/10/2022    HDL 66 (H) 10/10/2022     (H) 10/10/2022       Lab Results   Component Value Date    HGBA1C 5.20 10/10/2022       Pertinent Medications/Supplements:    Current Outpatient Medications   Medication Instructions   • cholecalciferol (VITAMIN D3) 1,000 Units, Oral, Daily   • estradiol (ESTRACE VAGINAL) 0.1 MG/GM vaginal cream Insert 1 gm intravaginally 3 times each week for 3 weeks and then weekly thereafter.   • Magnesium 100 MG tablet orally once a day   • NON FORMULARY Chocolate with magnesium and " potassium in it.   • pantoprazole (PROTONIX) 40 mg, Oral, Daily, For heartburn/gerd   • propranolol (INDERAL) 5 mg, Oral, Daily PRN       Nutrition Assessment:     Food assistance programs: None  Who prepares most meals: Patient or spouse  Who does grocery shopping: Patient or spouse    Frequency of eating out: 1-2 times per week ([employer] or [employer])   Food allergies/intolerances/aversions: None  Difficulty chewing: None  Difficulty swallowing: None  Gastrointestinal symptoms that impact intake or food choices: constipation on and off  Diet requirements related to personal preference or cultural belief: None  Support for health: Spouse and PCP    Diet recall:   Time Food/beverages consumed   8:00 am 1 egg, 1 slice wheat bread   11:00 am 3 oz chicken, 1/2 cup rice, 8 fl oz almond milk with a protein shake   9:00 pm 1/2 cup pasta, 4 oz tuna, 1 cup mixed salad   Beverages 32-48 fl oz water     Nutrition assessment comments: Patient stated the dietary recall above is an accurate representation of a typical day of eating for her. She stated she is afraid to eat anything with sugar or fat in it because she worries it will make her labs high. Patient also stated she will often skip meals when she feels anxious (2-3 days per week).    Physical activity:    Walking for 10 minutes per day.    Assessed Needs:     Estimated energy needs: 1650 calories per day ([place] [address]. Jeor, activity factor 1.3, adding 250 calories to promote safe weight gain)    Estimated protein needs: 50 grams per day (1.0 gram per kg of body weight)    PES Statement:     Inadequate oral intake related to fear of eating the wrong foods and general anxiety leading to low appetite as evidenced by dietary recall and difficulty gaining weight.      Discussion:     RD assured patient that her glucose is normal. RD explained the nutrition recommendations provided with assist her in keeping her blood glucose levels stable and healthy. To promote healthy weight  gain, RD recommended patient eat three meals and three snacks per day. RD worked with patient to develop a suitable meal plan for her. Patient decided on the followin:00 am - meal  10:00 am - snack  12:00 pm - meal  1:30 pm - snack  4:00 pm - snack  7:30 pm - meal    RD discussed the importance of eating consistent, balanced meals to promote overall health and weight loss. We reviewed the different food groups and identified what foods contain protein, carbohydrates, and fat. RD used the plate method to demonstrate the components of a balanced meal. RD provided patient with several meal and snack ideas. Patient stated she feels better about eating now that she knows the benefit it could provide her blood sugar and weight goals. RD recommended patient aim to consume 25 grams of fiber daily to promote healthy lipid levels. We discussed high fiber foods and patient stated she could easily incorporate these foods into her diet. Patient set her own goals and stated she feels confident she can accomplish them. RD encouraged patient to contact RD with any needs prior to the next scheduled visit.       Goals:    1) Eat three meals and three snacks per day.  2) Aim to have a source of protein, carbohydrates, and fat with each meal and snack.  3) Gradually increase fiber consumption by 5 grams per week until goal of 25 grams per day is reached.    Resources Provided:      [place] Meal Planning worksheets in Malay       Total of 60 minutes spent with patient on nutrition counseling. Education based on Academy of Nutrition and Dietetics guidelines. Patient was provided with RD's contact information. Follow up visit is scheduled for  at 8:45 am. Thank you for this referral.      Electronically signed by:  Lorna Dudley RD  22 13:58 EST

## 2022-12-12 ENCOUNTER — OFFICE VISIT (OUTPATIENT)
Dept: FAMILY MEDICINE CLINIC | Facility: CLINIC | Age: 57
End: 2022-12-12

## 2022-12-12 VITALS
OXYGEN SATURATION: 99 % | TEMPERATURE: 99.3 F | BODY MASS INDEX: 17.76 KG/M2 | SYSTOLIC BLOOD PRESSURE: 96 MMHG | DIASTOLIC BLOOD PRESSURE: 78 MMHG | HEART RATE: 73 BPM | WEIGHT: 106.6 LBS | HEIGHT: 65 IN

## 2022-12-12 DIAGNOSIS — Z23 NEED FOR INFLUENZA VACCINATION: ICD-10-CM

## 2022-12-12 DIAGNOSIS — F41.9 ANXIETY: Primary | ICD-10-CM

## 2022-12-12 PROCEDURE — 90686 IIV4 VACC NO PRSV 0.5 ML IM: CPT | Performed by: PHYSICIAN ASSISTANT

## 2022-12-12 PROCEDURE — 99213 OFFICE O/P EST LOW 20 MIN: CPT | Performed by: PHYSICIAN ASSISTANT

## 2022-12-12 PROCEDURE — 90471 IMMUNIZATION ADMIN: CPT | Performed by: PHYSICIAN ASSISTANT

## 2022-12-12 RX ORDER — HYDROXYZINE PAMOATE 25 MG/1
25 CAPSULE ORAL 3 TIMES DAILY PRN
Qty: 60 CAPSULE | Refills: 2 | Status: SHIPPED | OUTPATIENT
Start: 2022-12-12

## 2022-12-12 NOTE — PROGRESS NOTES
Follow Up Office Visit      Date: 2022   Patient Name: Tiny Turcios  : 1965   MRN: [id number]     Chief Complaint:    Chief Complaint   Patient presents with   • Med Refill     Medication refill, also check and explain the medications form the cardiologist       History of Present Illness: Tiny Turcios is a 57 y.o. female who is here today to follow up with medication refill.  She is a did see cardiology and they had given her propranolol 5 mg as needed palpitations but she is really not had to use this.  She did have concerns about dropping her blood pressure too low since her blood pressure does already run low.  She has had no other chest pain or shortness of breath.  She does need a refill on her Droxia seen which she uses as needed anxiety.      Subjective      Review of systems:  Review of Systems   Constitutional: Negative for fatigue and fever.   HENT: Negative for trouble swallowing.    Eyes: Negative for visual disturbance.   Respiratory: Negative for shortness of breath.    Cardiovascular: Negative for chest pain and leg swelling.   Psychiatric/Behavioral: The patient is nervous/anxious.         I have reviewed and the following portions of the patient's history were updated as appropriate: past family history, past medical history, past social history, past surgical history and problem list.    Medications:     Current Outpatient Medications:   •  cholecalciferol (VITAMIN D3) 25 MCG (1000 UT) tablet, Take 1 tablet by mouth Daily., Disp: 90 tablet, Rfl: 3  •  hydrOXYzine pamoate (Vistaril) 25 MG capsule, Take 1 capsule by mouth 3 (Three) Times a Day As Needed for Anxiety., Disp: 60 capsule, Rfl: 2    Allergies:   Allergies   Allergen Reactions   • Statins Other (See Comments)     Stomach Discomfort   • Hydrocodone Dizziness and Palpitations   • Tamsulosin Dizziness and Palpitations       Objective     Vital Signs:   Vitals:    22 1022   BP: 96/78  "  Pulse: 73   Temp: 99.3 °F (37.4 °C)   TempSrc: Infrared   SpO2: 99%   Weight: 48.4 kg (106 lb 9.6 oz)   Height: 165.1 cm (65\")   PainSc: 0-No pain     Body mass index is 17.74 kg/m².   BMI is below normal parameters (malnutrition). Recommendations: Monitor and discussed good eating habits and meals.      Physical Exam:   Physical Exam  Vitals and nursing note reviewed.   Constitutional:       Appearance: Normal appearance.   HENT:      Head: Normocephalic and atraumatic.      Nose: No congestion or rhinorrhea.      Mouth/Throat:      Mouth: Mucous membranes are moist.      Pharynx: Oropharynx is clear. No posterior oropharyngeal erythema.   Cardiovascular:      Rate and Rhythm: Normal rate and regular rhythm.   Pulmonary:      Effort: Pulmonary effort is normal.      Breath sounds: Normal breath sounds. No decreased breath sounds, wheezing, rhonchi or rales.   Musculoskeletal:      Cervical back: Neck supple.      Right lower leg: No edema.      Left lower leg: No edema.   Lymphadenopathy:      Cervical: No cervical adenopathy.   Neurological:      Mental Status: She is alert.          Assessment / Plan      Assessment/Plan:   Diagnoses and all orders for this visit:    1. Anxiety (Primary)  -     hydrOXYzine pamoate (Vistaril) 25 MG capsule; Take 1 capsule by mouth 3 (Three) Times a Day As Needed for Anxiety.  Dispense: 60 capsule; Refill: 2    2. Need for influenza vaccination  -     FluLaval/Fluzone >6 mos (7502-3656)    Meds as directed.  Flu vaccine given today.  Follow-up 3 months.    Follow Up:   No follow-ups on file.    Rochelle Stewart PA-C   [facility] [place]  "

## 2023-09-21 ENCOUNTER — TELEPHONE (OUTPATIENT)
Dept: FAMILY MEDICINE CLINIC | Facility: CLINIC | Age: 58
End: 2023-09-21

## 2023-09-21 NOTE — TELEPHONE ENCOUNTER
Caller: Tiny Turcios    Relationship: Self    Best call back number: [phone]     What is the best time to reach you: ANYTIME    Who are you requesting to speak with (clinical staff, provider,  specific staff member): CLINICAL    Do you know the name of the person who called: TINY    What was the call regarding: PATIENT APPLIED FOR INSURANCE AND WAS DENIED. THEY STATED THAT SHE HAS BEEN DIAGNOSED WITH DIABETES. THE PATIENT STATES THAT SHE'S NEVER BEEN INFORMED OF THIS DIAGNOSIS AND WOULD LIKE SOMEONE TO FOLLOW UP WITH HER TO CONFIRM THIS DIAGNOSIS AND IF SO SHE'S NEVER BEEN TREATED FOR THIS.    NEEDS .    Is it okay if the provider responds through MyChart: NO

## 2023-09-22 NOTE — TELEPHONE ENCOUNTER
I do not see anywhere in her chart that she had a diagnosis of diabetes. Nothing in my notes or on problem list. Her HgA1c was normal last October when she saw Felisha Brunner as well.

## 2023-09-25 NOTE — TELEPHONE ENCOUNTER
I called the pt with the  and gave her the below message. She states that she is going to appeal it and would like to know if you can write a letter stating that she doesn't have diabetes?

## 2024-01-16 ENCOUNTER — OFFICE VISIT (OUTPATIENT)
Dept: FAMILY MEDICINE CLINIC | Facility: CLINIC | Age: 59
End: 2024-01-16
Payer: COMMERCIAL

## 2024-01-16 ENCOUNTER — LAB (OUTPATIENT)
Dept: LAB | Facility: HOSPITAL | Age: 59
End: 2024-01-16
Payer: COMMERCIAL

## 2024-01-16 VITALS
SYSTOLIC BLOOD PRESSURE: 98 MMHG | WEIGHT: 109.8 LBS | TEMPERATURE: 98.6 F | HEART RATE: 60 BPM | BODY MASS INDEX: 18.29 KG/M2 | HEIGHT: 65 IN | DIASTOLIC BLOOD PRESSURE: 60 MMHG

## 2024-01-16 DIAGNOSIS — R63.6 UNDERWEIGHT ON EXAMINATION: ICD-10-CM

## 2024-01-16 DIAGNOSIS — E55.9 VITAMIN D DEFICIENCY: ICD-10-CM

## 2024-01-16 DIAGNOSIS — Z00.00 ANNUAL PHYSICAL EXAM: Primary | ICD-10-CM

## 2024-01-16 DIAGNOSIS — M81.8 OTHER OSTEOPOROSIS WITHOUT CURRENT PATHOLOGICAL FRACTURE: ICD-10-CM

## 2024-01-16 DIAGNOSIS — Z00.00 ANNUAL PHYSICAL EXAM: ICD-10-CM

## 2024-01-16 LAB
25(OH)D3 SERPL-MCNC: 31.8 NG/ML (ref 30–100)
ALBUMIN SERPL-MCNC: 4.5 G/DL (ref 3.5–5.2)
ALBUMIN/GLOB SERPL: 2.1 G/DL
ALP SERPL-CCNC: 69 U/L (ref 39–117)
ALT SERPL W P-5'-P-CCNC: 30 U/L (ref 1–33)
ANION GAP SERPL CALCULATED.3IONS-SCNC: 9 MMOL/L (ref 5–15)
AST SERPL-CCNC: 31 U/L (ref 1–32)
BILIRUB SERPL-MCNC: 0.3 MG/DL (ref 0–1.2)
BUN SERPL-MCNC: 17 MG/DL (ref 6–20)
BUN/CREAT SERPL: 20.5 (ref 7–25)
CALCIUM SPEC-SCNC: 9.3 MG/DL (ref 8.6–10.5)
CHLORIDE SERPL-SCNC: 105 MMOL/L (ref 98–107)
CHOLEST SERPL-MCNC: 228 MG/DL (ref 0–200)
CO2 SERPL-SCNC: 25 MMOL/L (ref 22–29)
CREAT SERPL-MCNC: 0.83 MG/DL (ref 0.57–1)
DEPRECATED RDW RBC AUTO: 35.6 FL (ref 37–54)
EGFRCR SERPLBLD CKD-EPI 2021: 81.8 ML/MIN/1.73
ERYTHROCYTE [DISTWIDTH] IN BLOOD BY AUTOMATED COUNT: 11.8 % (ref 12.3–15.4)
GLOBULIN UR ELPH-MCNC: 2.1 GM/DL
GLUCOSE SERPL-MCNC: 89 MG/DL (ref 65–99)
HBA1C MFR BLD: 5.3 % (ref 4.8–5.6)
HCT VFR BLD AUTO: 41.1 % (ref 34–46.6)
HDLC SERPL-MCNC: 67 MG/DL (ref 40–60)
HGB BLD-MCNC: 13.3 G/DL (ref 12–15.9)
LDLC SERPL CALC-MCNC: 154 MG/DL (ref 0–100)
LDLC/HDLC SERPL: 2.27 {RATIO}
MCH RBC QN AUTO: 27.4 PG (ref 26.6–33)
MCHC RBC AUTO-ENTMCNC: 32.4 G/DL (ref 31.5–35.7)
MCV RBC AUTO: 84.6 FL (ref 79–97)
PLATELET # BLD AUTO: 315 10*3/MM3 (ref 140–450)
PMV BLD AUTO: 10.2 FL (ref 6–12)
POTASSIUM SERPL-SCNC: 4.6 MMOL/L (ref 3.5–5.2)
PROT SERPL-MCNC: 6.6 G/DL (ref 6–8.5)
RBC # BLD AUTO: 4.86 10*6/MM3 (ref 3.77–5.28)
SODIUM SERPL-SCNC: 139 MMOL/L (ref 136–145)
T4 FREE SERPL-MCNC: 1.36 NG/DL (ref 0.93–1.7)
TRIGL SERPL-MCNC: 45 MG/DL (ref 0–150)
TSH SERPL DL<=0.05 MIU/L-ACNC: 3.05 UIU/ML (ref 0.27–4.2)
VIT B12 BLD-MCNC: 941 PG/ML (ref 211–946)
VLDLC SERPL-MCNC: 7 MG/DL (ref 5–40)
WBC NRBC COR # BLD AUTO: 5.23 10*3/MM3 (ref 3.4–10.8)

## 2024-01-16 PROCEDURE — 80050 GENERAL HEALTH PANEL: CPT

## 2024-01-16 PROCEDURE — 99396 PREV VISIT EST AGE 40-64: CPT | Performed by: PHYSICIAN ASSISTANT

## 2024-01-16 PROCEDURE — 84439 ASSAY OF FREE THYROXINE: CPT

## 2024-01-16 PROCEDURE — 82306 VITAMIN D 25 HYDROXY: CPT

## 2024-01-16 PROCEDURE — 83036 HEMOGLOBIN GLYCOSYLATED A1C: CPT

## 2024-01-16 PROCEDURE — 36415 COLL VENOUS BLD VENIPUNCTURE: CPT

## 2024-01-16 PROCEDURE — 82607 VITAMIN B-12: CPT

## 2024-01-16 PROCEDURE — 80061 LIPID PANEL: CPT

## 2024-01-16 RX ORDER — ACYCLOVIR 50 MG/G
1 OINTMENT TOPICAL 4 TIMES DAILY PRN
Qty: 15 G | Refills: 3 | Status: SHIPPED | OUTPATIENT
Start: 2024-01-16

## 2024-01-16 NOTE — PROGRESS NOTES
Female Physical Note      Date: 2024   Patient Name: Tiny Turcios  : 1965   MRN: [id number]     Chief Complaint:    Chief Complaint   Patient presents with    Annual Exam      [id number] Dani    History of Present Illness: Tiny Turcios is a 58 y.o. female who is here today for their annual health maintenance and physical.  She states she has been doing well.  She denies any problems.  She does need a refill on acyclovir 5% cream or ointment.    Visit is accomplished using third-party  as noted above.      Subjective      Review of Systems:   Review of Systems   Constitutional:  Negative for fatigue and fever.   HENT:  Negative for trouble swallowing.    Eyes:  Negative for visual disturbance.   Respiratory:  Negative for cough and shortness of breath.    Cardiovascular:  Negative for chest pain and leg swelling.   Gastrointestinal:  Negative for abdominal pain.       Past Medical History, Social History, Family History and Care Team were all reviewed with patient and updated as appropriate.     Medications:     Current Outpatient Medications:     cholecalciferol (VITAMIN D3) 25 MCG (1000 UT) tablet, Take 1 tablet by mouth Daily., Disp: 90 tablet, Rfl: 3    Multiple Vitamins-Minerals (b complex-C-E-zinc) tablet, Take 1 tablet by mouth Daily., Disp: , Rfl:     acyclovir (Zovirax) 5 % ointment, Apply 1 application  topically to the appropriate area as directed 4 (Four) Times a Day As Needed (fever blisters)., Disp: 15 g, Rfl: 3    Allergies:   Allergies   Allergen Reactions    Statins Other (See Comments)     Stomach Discomfort    Hydrocodone Dizziness and Palpitations    Tamsulosin Dizziness and Palpitations       PHQ-9 Depression Screening  Little interest or pleasure in doing things? 0-->not at all   Feeling down, depressed, or hopeless? 0-->not at all   Trouble falling or staying asleep, or sleeping too much?     Feeling tired or having little  "energy?     Poor appetite or overeating?     Feeling bad about yourself - or that you are a failure or have let yourself or your family down?     Trouble concentrating on things, such as reading the newspaper or watching television?     Moving or speaking so slowly that other people could have noticed? Or the opposite - being so fidgety or restless that you have been moving around a lot more than usual?     Thoughts that you would be better off dead, or of hurting yourself in some way?     PHQ-9 Total Score 0   If you checked off any problems, how difficult have these problems made it for you to do your work, take care of things at home, or get along with other people?             Objective     Vital Signs:   Vitals:    01/16/24 0849   BP: 98/60   Pulse: 60   Temp: 98.6 °F (37 °C)   Weight: 49.8 kg (109 lb 12.8 oz)   Height: 165.1 cm (65\")   PainSc: 0-No pain     Body mass index is 18.27 kg/m².   BMI is below normal parameters (malnutrition). Recommendations: Information on healthy weight added to patient's after visit summary      Physical Exam:   Physical Exam  Vitals and nursing note reviewed.   Constitutional:       General: She is not in acute distress.     Appearance: Normal appearance. She is well-developed.   HENT:      Head: Normocephalic and atraumatic.      Right Ear: Tympanic membrane and ear canal normal. There is no impacted cerumen.      Left Ear: Tympanic membrane and ear canal normal. There is no impacted cerumen.      Nose: Nose normal. No congestion or rhinorrhea.      Mouth/Throat:      Mouth: Mucous membranes are moist.      Pharynx: Oropharynx is clear. No oropharyngeal exudate or posterior oropharyngeal erythema.   Eyes:      General: No scleral icterus.        Right eye: No discharge.         Left eye: No discharge.      Extraocular Movements: Extraocular movements intact.      Conjunctiva/sclera: Conjunctivae normal.      Pupils: Pupils are equal, round, and reactive to light.   Neck:      " Thyroid: No thyromegaly.      Vascular: No carotid bruit.   Cardiovascular:      Rate and Rhythm: Normal rate and regular rhythm.      Heart sounds: Normal heart sounds. No murmur heard.  Pulmonary:      Breath sounds: Normal breath sounds. No wheezing, rhonchi or rales.   Abdominal:      General: Bowel sounds are normal. There is no distension.      Palpations: Abdomen is soft. There is no mass.      Tenderness: There is no abdominal tenderness.   Musculoskeletal:         General: No swelling. Normal range of motion.      Cervical back: Normal range of motion and neck supple.      Right lower leg: No edema.      Left lower leg: No edema.   Lymphadenopathy:      Cervical: No cervical adenopathy.   Skin:     General: Skin is warm.      Coloration: Skin is not jaundiced or pale.      Findings: No bruising or rash.   Neurological:      General: No focal deficit present.      Mental Status: She is alert.      Cranial Nerves: No cranial nerve deficit.      Motor: No weakness.      Gait: Gait normal.   Psychiatric:         Mood and Affect: Mood normal.         Behavior: Behavior normal.         Judgment: Judgment normal.          Procedures    Assessment / Plan      Assessment/Plan:   Diagnoses and all orders for this visit:    1. Annual physical exam (Primary)  -     Comprehensive metabolic panel; Future  -     Hemoglobin A1c; Future  -     TSH; Future  -     T4, free; Future  -     Lipid panel; Future  -     CBC No Differential; Future  -     Vitamin B12; Future    2. Underweight on examination    3. Vitamin D deficiency  -     Vitamin D 25 hydroxy; Future    4. Other osteoporosis without current pathological fracture  -     DEXA Bone Density Axial; Future    Other orders  -     acyclovir (Zovirax) 5 % ointment; Apply 1 application  topically to the appropriate area as directed 4 (Four) Times a Day As Needed (fever blisters).  Dispense: 15 g; Refill: 3           Follow Up:   No follow-ups on file.    Healthcare  Maintenance:   Counseling provided on healthy eating, healthy weight.  She is also due for a bone density test due to her history of osteoporosis.  We will arrange that.  Labs to be done today.  Tiny Turcios voices understanding and acceptance of this advice and will call back with any further questions or concerns. AVS with preventive healthcare tips printed for patient.     Rochelle Stewart PA-C   [facility] [place]

## 2024-01-22 ENCOUNTER — PRIOR AUTHORIZATION (OUTPATIENT)
Dept: FAMILY MEDICINE CLINIC | Facility: CLINIC | Age: 59
End: 2024-01-22
Payer: COMMERCIAL

## 2024-01-25 ENCOUNTER — TELEPHONE (OUTPATIENT)
Dept: FAMILY MEDICINE CLINIC | Facility: CLINIC | Age: 59
End: 2024-01-25
Payer: COMMERCIAL

## 2024-01-25 NOTE — TELEPHONE ENCOUNTER
Caller: Tiny Turcios    Relationship: Self    Best call back number: [phone]    Caller requesting test results: PATIENT    What test was performed: LABS    When was the test performed: 1/16/24    Where was the test performed: OFFICE    Additional notes: PATIENT WOULD LIKE A CALL ABOUT HER LAB RESULTS. SHE HAS QUESTIONS ABOUT THE LIPIDS.     ALSO PHARMACY HAS A CREAM AT [facility] AND THE INSURANCE WILL NOT PAY FOR THE CREAM. PATIENT NEEDS TO SEE WHY

## 2024-01-29 NOTE — TELEPHONE ENCOUNTER
Her labs looked good overall. Her cholesterol was mildly high at 224. Her good cholesterol HDL was excellent at 67. The only lab of concern was her LDL cholesterol which was high at 154, I would like to see this below 100. However I don't want to start medication just yet, I want her to eat a good diet with a lot of fiber, and recheck these labs in 6 months.

## 2024-01-30 NOTE — TELEPHONE ENCOUNTER
The RDW measures the size of red blood cells. A low RDW 10.2% and below can be a sign of anemia, where the red blood cells small or different sizes. Her RDW is 11.8%, and the rest of the blood counts are normal, so this is not a worrisome finding since everything else looks good with the red blood cell count and hemoglobin and hematocrit.

## 2024-01-31 NOTE — TELEPHONE ENCOUNTER
"Hub can read       \"\"  The RDW measures the size of red blood cells. A low RDW 10.2% and below can be a sign of anemia, where the red blood cells small or different sizes. Her RDW is 11.8%, and the rest of the blood counts are normal, so this is not a worrisome finding since everything else looks good with the red blood cell count and hemoglobin and hematocrit.         "

## 2024-02-20 ENCOUNTER — PATIENT MESSAGE (OUTPATIENT)
Dept: FAMILY MEDICINE CLINIC | Facility: CLINIC | Age: 59
End: 2024-02-20
Payer: COMMERCIAL

## 2024-03-01 ENCOUNTER — OFFICE VISIT (OUTPATIENT)
Dept: FAMILY MEDICINE CLINIC | Facility: CLINIC | Age: 59
End: 2024-03-01
Payer: COMMERCIAL

## 2024-03-01 VITALS
WEIGHT: 110 LBS | DIASTOLIC BLOOD PRESSURE: 66 MMHG | BODY MASS INDEX: 18.33 KG/M2 | OXYGEN SATURATION: 99 % | HEIGHT: 65 IN | HEART RATE: 76 BPM | TEMPERATURE: 98.4 F | SYSTOLIC BLOOD PRESSURE: 112 MMHG

## 2024-03-01 DIAGNOSIS — J02.9 ACUTE PHARYNGITIS, UNSPECIFIED ETIOLOGY: ICD-10-CM

## 2024-03-01 DIAGNOSIS — R52 PAIN: ICD-10-CM

## 2024-03-01 DIAGNOSIS — J02.9 SORE THROAT: Primary | ICD-10-CM

## 2024-03-01 DIAGNOSIS — M79.605 LEFT LEG PAIN: ICD-10-CM

## 2024-03-01 DIAGNOSIS — R05.9 COUGH, UNSPECIFIED TYPE: ICD-10-CM

## 2024-03-01 LAB
EXPIRATION DATE: NORMAL
FLUAV AG NPH QL: NEGATIVE
FLUBV AG NPH QL: NEGATIVE
INTERNAL CONTROL: NORMAL
Lab: NORMAL
S PYO AG THROAT QL: NEGATIVE
SARS-COV-2 AG UPPER RESP QL IA.RAPID: NOT DETECTED

## 2024-03-01 RX ORDER — IBUPROFEN 600 MG/1
600 TABLET ORAL EVERY 6 HOURS PRN
Qty: 30 TABLET | Refills: 0 | Status: SHIPPED | OUTPATIENT
Start: 2024-03-01

## 2024-03-01 RX ORDER — IBUPROFEN 600 MG/1
600 TABLET ORAL EVERY 6 HOURS PRN
Qty: 30 TABLET | Refills: 0 | Status: SHIPPED | OUTPATIENT
Start: 2024-03-01 | End: 2024-03-01

## 2024-03-01 RX ORDER — AMOXICILLIN 875 MG/1
875 TABLET, COATED ORAL 2 TIMES DAILY
Qty: 20 TABLET | Refills: 0 | Status: SHIPPED | OUTPATIENT
Start: 2024-03-01 | End: 2024-03-11

## 2024-03-01 NOTE — LETTER
March 1, 2024     Patient: Tiny Turcios   YOB: 1965   Date of Visit: 3/1/2024       To Whom It May Concern:    It is my medical opinion that Tiny Turcios may return to work on Sunday 03/03/2024.         Sincerely,        SARBJIT Peguero    CC: No Recipients

## 2024-03-01 NOTE — PROGRESS NOTES
"Chief Complaint  URI (Sore throat, fever, chills, mild cough/X 1 day)    Subjective          Tiny Turcios presents to [facility]  History of Present Illness  Patient is a 58-year-old female.  She is here for same-day sick visit.  She is complaining of a sore throat, fever, chills,  1 day. Pain is worse on right.   She has been taking advil prn. It was helpful.       Patient speaks some English / Icelandic is primary. She is requesting.   : Using Language line solution: Jessica ( [id number]).     She is requesting medication refill for her leg pain. It is intermittent.   Will refill at lower dose as recommenced. She is agreeable.     URI   Associated symptoms include coughing and a sore throat.       The following portions of the patient's history were reviewed and updated as appropriate: allergies, current medications, past family history, past medical history, past social history, past surgical history and problem list.    Review of Systems   Constitutional:  Positive for activity change, chills, fatigue and fever.   HENT:  Positive for sore throat.    Respiratory:  Positive for cough.    Cardiovascular: Negative.    Gastrointestinal: Negative.    Musculoskeletal:  Positive for myalgias.        Leg pain - intermittent    Hematological: Negative.    Psychiatric/Behavioral: Negative.           Objective   Vital Signs:   /66 (BP Location: Left arm, Patient Position: Sitting, Cuff Size: Adult)   Pulse 76   Temp 98.4 °F (36.9 °C) (Temporal)   Ht 165.1 cm (65\")   Wt 49.9 kg (110 lb)   SpO2 99%   BMI 18.30 kg/m²                 Physical Exam  Vitals reviewed.   HENT:      Head: Normocephalic.      Right Ear: Hearing, tympanic membrane, ear canal and external ear normal.      Left Ear: Hearing, tympanic membrane, ear canal and external ear normal.      Nose: Congestion present.      Mouth/Throat:      Mouth: Mucous membranes are moist.      Pharynx: Posterior " oropharyngeal erythema present.      Tonsils: 3+ on the right. 3+ on the left.   Eyes:      Pupils: Pupils are equal, round, and reactive to light.   Cardiovascular:      Pulses: Normal pulses.      Heart sounds: Normal heart sounds.   Pulmonary:      Effort: Pulmonary effort is normal.      Breath sounds: Normal breath sounds.   Abdominal:      Palpations: Abdomen is soft.   Skin:     General: Skin is warm and dry.      Capillary Refill: Capillary refill takes less than 2 seconds.   Neurological:      Mental Status: She is alert and oriented to person, place, and time.        Result Review :            POCT Influenza A/B (03/01/2024 11:06)  POCT VERITOR SARS-CoV-2 Antigen (03/01/2024 11:06)  POCT rapid strep A (03/01/2024 11:07)     Assessment and Plan    Diagnoses and all orders for this visit:    1. Sore throat (Primary)  -     POCT Influenza A/B  -     POCT VERITOR SARS-CoV-2 Antigen  -     POCT rapid strep A    2. Cough, unspecified type    3. Acute pharyngitis, unspecified etiology  -     amoxicillin (AMOXIL) 875 MG tablet; Take 1 tablet by mouth 2 (Two) Times a Day for 10 days.  Dispense: 20 tablet; Refill: 0    4. Pain  -     Discontinue: ibuprofen (ADVIL,MOTRIN) 600 MG tablet; Take 1 tablet by mouth Every 6 (Six) Hours As Needed for Mild Pain or Fever.  Dispense: 30 tablet; Refill: 0  -     ibuprofen (ADVIL,MOTRIN) 600 MG tablet; Take 1 tablet by mouth Every 6 (Six) Hours As Needed for Mild Pain or Fever.  Dispense: 30 tablet; Refill: 0    5. Left leg pain  -     Discontinue: ibuprofen (ADVIL,MOTRIN) 600 MG tablet; Take 1 tablet by mouth Every 6 (Six) Hours As Needed for Mild Pain or Fever.  Dispense: 30 tablet; Refill: 0  -     ibuprofen (ADVIL,MOTRIN) 600 MG tablet; Take 1 tablet by mouth Every 6 (Six) Hours As Needed for Mild Pain or Fever.  Dispense: 30 tablet; Refill: 0      POCT influenza A/B: Both negative  POCT SARS-COV 2: not detected   POCT rapid strep day: negative       Treating due to symptoms.        Gargle warm salt water 1/4 teaspoon salt in 4 oz.warm water twice daily as needed.   Change toothbrush in 1 week   Avoid dairy products - this can increase your congestion.     Follow up with your PCP as needed.           Follow Up   Return if symptoms worsen or fail to improve.  Patient was given instructions and counseling regarding her condition or for health maintenance advice. Please see specific information pulled into the AVS if appropriate.

## 2024-05-02 ENCOUNTER — OFFICE VISIT (OUTPATIENT)
Dept: FAMILY MEDICINE CLINIC | Facility: CLINIC | Age: 59
End: 2024-05-02
Payer: COMMERCIAL

## 2024-05-02 VITALS
SYSTOLIC BLOOD PRESSURE: 98 MMHG | DIASTOLIC BLOOD PRESSURE: 68 MMHG | HEIGHT: 65 IN | BODY MASS INDEX: 18.33 KG/M2 | TEMPERATURE: 98.4 F | WEIGHT: 110 LBS | OXYGEN SATURATION: 98 % | HEART RATE: 94 BPM

## 2024-05-02 DIAGNOSIS — R09.81 NASAL CONGESTION: Primary | ICD-10-CM

## 2024-05-02 LAB
EXPIRATION DATE: NORMAL
EXPIRATION DATE: NORMAL
FLUAV AG NPH QL: NEGATIVE
FLUBV AG NPH QL: NEGATIVE
INTERNAL CONTROL: NORMAL
INTERNAL CONTROL: NORMAL
Lab: NORMAL
Lab: NORMAL
SARS-COV-2 AG UPPER RESP QL IA.RAPID: NOT DETECTED

## 2024-05-02 PROCEDURE — 87804 INFLUENZA ASSAY W/OPTIC: CPT | Performed by: STUDENT IN AN ORGANIZED HEALTH CARE EDUCATION/TRAINING PROGRAM

## 2024-05-02 PROCEDURE — 87426 SARSCOV CORONAVIRUS AG IA: CPT | Performed by: STUDENT IN AN ORGANIZED HEALTH CARE EDUCATION/TRAINING PROGRAM

## 2024-05-02 PROCEDURE — 99213 OFFICE O/P EST LOW 20 MIN: CPT | Performed by: STUDENT IN AN ORGANIZED HEALTH CARE EDUCATION/TRAINING PROGRAM

## 2024-05-02 RX ORDER — CETIRIZINE HYDROCHLORIDE 10 MG/1
10 TABLET ORAL DAILY
Qty: 30 TABLET | Refills: 1 | Status: SHIPPED | OUTPATIENT
Start: 2024-05-02

## 2024-05-02 RX ORDER — ALBUTEROL SULFATE 90 UG/1
2 AEROSOL, METERED RESPIRATORY (INHALATION) EVERY 4 HOURS PRN
Qty: 6.7 G | Refills: 0 | Status: SHIPPED | OUTPATIENT
Start: 2024-05-02

## 2024-05-02 RX ORDER — GUAIFENESIN 600 MG/1
1200 TABLET, EXTENDED RELEASE ORAL 2 TIMES DAILY
Qty: 20 TABLET | Refills: 0 | Status: SHIPPED | OUTPATIENT
Start: 2024-05-02

## 2024-05-06 PROBLEM — R09.81 NASAL CONGESTION: Status: ACTIVE | Noted: 2024-05-06

## 2024-05-17 ENCOUNTER — OFFICE VISIT (OUTPATIENT)
Dept: OBSTETRICS AND GYNECOLOGY | Facility: CLINIC | Age: 59
End: 2024-05-17
Payer: COMMERCIAL

## 2024-05-17 VITALS — DIASTOLIC BLOOD PRESSURE: 70 MMHG | WEIGHT: 116 LBS | BODY MASS INDEX: 19.3 KG/M2 | SYSTOLIC BLOOD PRESSURE: 114 MMHG

## 2024-05-17 DIAGNOSIS — Z78.0 ASYMPTOMATIC MENOPAUSE: ICD-10-CM

## 2024-05-17 DIAGNOSIS — Z12.31 SCREENING MAMMOGRAM FOR BREAST CANCER: ICD-10-CM

## 2024-05-17 DIAGNOSIS — N95.2 VAGINAL ATROPHY: ICD-10-CM

## 2024-05-17 DIAGNOSIS — Z01.419 WOMEN'S ANNUAL ROUTINE GYNECOLOGICAL EXAMINATION: Primary | ICD-10-CM

## 2024-05-17 DIAGNOSIS — A60.04 HERPES, VULVOVAGINITIS: ICD-10-CM

## 2024-05-17 RX ORDER — VALACYCLOVIR HYDROCHLORIDE 1 G/1
1000 TABLET, FILM COATED ORAL 2 TIMES DAILY
Qty: 20 TABLET | Refills: 0 | Status: SHIPPED | OUTPATIENT
Start: 2024-05-17 | End: 2024-05-27

## 2024-05-17 RX ORDER — ESTRADIOL 0.1 MG/G
CREAM VAGINAL
Qty: 1 EACH | Refills: 12 | Status: SHIPPED | OUTPATIENT
Start: 2024-05-17

## 2024-05-17 NOTE — PROGRESS NOTES
Gynecologic Annual Exam Note        GYN Annual Exam     CC - Here for annual exam.        BARBER Turcios is a 58 y.o. female, , who presents for annual well woman exam as an established patient.  She is postmenopausal.. Denies vaginal bleeding.   There were no changes to her medical or surgical history since her last visit. Marital Status: .  She is sexually active. She has not had new partners. STD testing recommendations have been explained to the patient and she declines STD testing. Patient reports that she has HSV but has had it for years. Patient also reports a recent outbreak and reports she used to have a cream to apply to the area during an outbreak but her insurance stopped covering it so she stopped using the cream. Patient is requesting an Rx for Valacyclovir to take during outbreaks. Patient also reports vaginal dryness and requests a vaginal cream to help with this.    The patient would like to discuss the following complaints today: Patient reports hot flashes, occasional night sweats, vaginal dryness, and irritability/mood changes. Patient would like to discuss menopause s/s with provider, and wants to talk about how long menopause typically lasts.      Additional OB/GYN History   On HRT? No    Last Pap : 2019. Results: negative. HPV: negative. [place]  Last Completed Pap Smear       This patient has no relevant Health Maintenance data.          History of abnormal Pap smear: no  Family history of uterine, colon, breast, or ovarian cancer: no  Performs monthly Self-Breast Exam: no  Last mammogram: 22. Done at . There is a copy in the chart.    Last Completed Mammogram       This patient has no relevant Health Maintenance data.          Last colonoscopy: has had a colonoscopy 4 years ago    Last Completed Colonoscopy            COLORECTAL CANCER SCREENING (COLONOSCOPY - Every 10 Years) Next due on 3/1/2030      2020  COLONOSCOPY (Done - nl per  pt)                    Her last bone density scan was about 3 years ago. Patient is concerned that her insurance may not cover the cost of another test but she knows she is due to have another Dexa soon.   Exercises Regularly: no  Feelings of Anxiety or Depression: yes - anxiety d/t her job, but declines medication      Tobacco Usage?: No       Current Outpatient Medications:     cholecalciferol (VITAMIN D3) 25 MCG (1000 UT) tablet, Take 1 tablet by mouth Daily., Disp: 90 tablet, Rfl: 3    Multiple Vitamins-Minerals (b complex-C-E-zinc) tablet, Take 1 tablet by mouth Daily., Disp: , Rfl:     estradiol (ESTRACE VAGINAL) 0.1 MG/GM vaginal cream, Insert 1 gm intravaginally 1-3 times each week, Disp: 1 each, Rfl: 12    valACYclovir (Valtrex) 1000 MG tablet, Take 1 tablet by mouth 2 (Two) Times a Day for 10 days., Disp: 20 tablet, Rfl: 0    Patient does not need refills, but is requesting an Rx for vaginal dryness as well as something to take for HSV outbreaks.     OB History          2    Para   1    Term   1            AB   1    Living   1         SAB   1    IAB        Ectopic        Molar        Multiple        Live Births   1                Past Medical History:   Diagnosis Date    Anxiety     Arthritis     arthritis/unspecified    Breast mass, right 2021    Bursitis     Frequent UTI     HSV infection     Hypercholesterolemia     Migraine     Palpitations     Renal calculi         Past Surgical History:   Procedure Laterality Date     SECTION      COLONOSCOPY      KIDNEY STONE SURGERY      lithotripsy       Health Maintenance   Topic Date Due    DXA SCAN  Never done    Annual Gynecologic Pelvic and Breast Exam  2022    PAP SMEAR  2022    MAMMOGRAM  2024    COVID-19 Vaccine (3 - 2023-24 season) 2025 (Originally 2023)    ZOSTER VACCINE (1 of 2) 2025 (Originally 2015)    INFLUENZA VACCINE  2024    ANNUAL PHYSICAL  2025    LIPID PANEL  2025     TDAP/TD VACCINES (2 - Td or Tdap) 01/01/2027    COLORECTAL CANCER SCREENING  03/01/2030    HEPATITIS C SCREENING  Completed    Pneumococcal Vaccine 0-64  Aged Out       The additional following portions of the patient's history were reviewed and updated as appropriate: allergies, current medications, past family history, past medical history, past social history, past surgical history, and problem list.    Review of Systems   Endocrine: Positive for heat intolerance.   Genitourinary:         Vaginal dryness   Psychiatric/Behavioral:  Positive for agitation.    All other systems reviewed and are negative.      I have reviewed and agree with the HPI, ROS, and historical information as entered above. Ashley Eckert, SARBJIT      Objective   /70   Wt 52.6 kg (116 lb)   LMP  (LMP Unknown)   BMI 19.30 kg/m²     Physical Exam  Vitals and nursing note reviewed. Exam conducted with a chaperone present.   Constitutional:       General: She is not in acute distress.     Appearance: Normal appearance. She is well-developed. She is not ill-appearing.   Neck:      Thyroid: No thyroid mass or thyromegaly.   Pulmonary:      Effort: Pulmonary effort is normal. No respiratory distress or retractions.   Chest:      Chest wall: No mass.   Breasts:     Right: Normal. No mass, nipple discharge, skin change or tenderness.      Left: Normal. No mass, nipple discharge, skin change or tenderness.      Comments: Fibrocystic tissue present bilaterally    Abdominal:      General: There is no distension.      Palpations: Abdomen is soft. Abdomen is not rigid. There is no mass.      Tenderness: There is no abdominal tenderness. There is no guarding or rebound.      Hernia: No hernia is present.   Genitourinary:     Exam position: Lithotomy position.      Labia:         Right: No rash, tenderness or lesion.         Left: No rash, tenderness or lesion.       Vagina: No signs of injury and foreign body. Vaginal discharge, erythema and  tenderness present. No bleeding or lesions.      Cervix: No cervical bleeding.      Rectum: No external hemorrhoid.          Comments: Herpes ulcer on right perineal area. Introitus is red and raw, atrophic. .There is a moderate amount of dark yellow/greenish discharge present. Was unable to visualize cervix to obtain pap smear due to pt intolerance of speculum exam with small spec. Attempted BME to discern if cervix was anterior/posterior/displaced, but pt was unable to tolerate fingers either. Pediatric speculum attempted, but this caused significant pain as well and patient would not tolerate it being opened enough to visualize cervix for pap.  Paddle used to swab further than visualized.   Musculoskeletal:      Cervical back: No muscular tenderness.   Skin:     General: Skin is warm and dry.   Neurological:      Mental Status: She is alert and oriented to person, place, and time.   Psychiatric:         Mood and Affect: Mood normal.         Behavior: Behavior normal.            Assessment and Plan    Problem List Items Addressed This Visit    None  Visit Diagnoses       Women's annual routine gynecological examination    -  Primary    Relevant Orders    LIQUID-BASED PAP SMEAR WITH HPV GENOTYPING IF ASCUS ([facility])    Screening mammogram for breast cancer        Relevant Orders    Mammo Screening Digital Tomosynthesis Bilateral With CAD    Asymptomatic menopause        Relevant Orders    DEXA Bone Density Axial    Herpes, vulvovaginitis        Relevant Medications    valACYclovir (Valtrex) 1000 MG tablet    Vaginal atrophy        Relevant Medications    estradiol (ESTRACE VAGINAL) 0.1 MG/GM vaginal cream            GYN annual well woman exam.   Pap specimen sent but will almost certainly be insufficient for testing. BV/Yeast/herpes done on this sample as well. Will treat based on results. Pt aware she may need to come back in for repeat pap, atrophy will hopefully be better with the estrogen cream by that  point.   Atrophic vaginitis- estradiol cream sent.   Valtrex sent for herpes outbreaks.   Reviewed monthly self breast exams.  Instructed to call with lumps, pain, or breast discharge.  Yearly mammograms ordered.  Ordered mammogram today.  Recommended use of Vitamin D and getting adequate calcium in her diet. (1500mg)  Osteoporosis screening ordered today.  Reviewed exercise as a preventative health measures.   Reviewed BMI and weight loss as preventative health measures.   Symptoms of menopausal transition reviewed with patient.  Reviewed risks/benefits of OTC, non-hormonal and hormonal treatment for vasomotor and other menopausal symptoms.  RTC in 1 year or PRN with problems.  Return in about 1 year (around 5/17/2025) for Annual physical.         Ashley Eckert, APRN  05/17/2024

## 2024-05-22 LAB — REF LAB TEST METHOD: NORMAL

## 2024-09-04 ENCOUNTER — OFFICE VISIT (OUTPATIENT)
Dept: FAMILY MEDICINE CLINIC | Facility: CLINIC | Age: 59
End: 2024-09-04
Payer: COMMERCIAL

## 2024-09-04 VITALS
WEIGHT: 115 LBS | RESPIRATION RATE: 21 BRPM | BODY MASS INDEX: 19.14 KG/M2 | TEMPERATURE: 98.4 F | HEART RATE: 81 BPM | OXYGEN SATURATION: 97 % | SYSTOLIC BLOOD PRESSURE: 104 MMHG | DIASTOLIC BLOOD PRESSURE: 64 MMHG

## 2024-09-04 DIAGNOSIS — R68.89 FLU-LIKE SYMPTOMS: Primary | ICD-10-CM

## 2024-09-04 DIAGNOSIS — Z75.8 TELEPHONE LANGUAGE INTERPRETER SERVICE REQUIRED: ICD-10-CM

## 2024-09-04 DIAGNOSIS — J10.1 INFLUENZA A: ICD-10-CM

## 2024-09-04 LAB
EXPIRATION DATE: ABNORMAL
FLUAV AG NPH QL: POSITIVE
FLUBV AG NPH QL: NEGATIVE
INTERNAL CONTROL: ABNORMAL
Lab: ABNORMAL

## 2024-09-04 PROCEDURE — 87804 INFLUENZA ASSAY W/OPTIC: CPT | Performed by: FAMILY MEDICINE

## 2024-09-04 PROCEDURE — 99214 OFFICE O/P EST MOD 30 MIN: CPT | Performed by: FAMILY MEDICINE

## 2024-09-04 RX ORDER — ONDANSETRON 4 MG/1
4 TABLET, ORALLY DISINTEGRATING ORAL EVERY 8 HOURS PRN
Qty: 12 TABLET | Refills: 0 | Status: SHIPPED | OUTPATIENT
Start: 2024-09-04

## 2024-09-04 RX ORDER — OSELTAMIVIR PHOSPHATE 75 MG/1
CAPSULE ORAL
COMMUNITY
Start: 2024-09-02

## 2024-09-04 RX ORDER — DEXTROMETHORPHAN HYDROBROMIDE AND PROMETHAZINE HYDROCHLORIDE 15; 6.25 MG/5ML; MG/5ML
SYRUP ORAL
COMMUNITY
Start: 2024-09-02

## 2024-09-04 NOTE — PROGRESS NOTES
Subjective   Tiny Turcios is a 58 y.o. female.     History of Present Illness  used.      She is reporting she tested positive for flu on Sunday fever diagnosed with Flu.  She was prescribed tamiflu.  She was given cough syrup.  Promethazine syrup.      She reports the tamiflu caused nausea.  She has still having fever and feeling dizzy.  She is having nasal congestion and throat drainage.  She is still having shivers.     She is taking advil.      The following portions of the patient's history were reviewed and updated as appropriate: allergies, current medications, past family history, past medical history, past social history, past surgical history, and problem list.    Review of Systems   Constitutional:  Positive for chills and fatigue.   HENT:  Positive for congestion, postnasal drip and rhinorrhea.    Respiratory:  Positive for cough. Negative for choking, shortness of breath and wheezing.    Cardiovascular:  Negative for chest pain and leg swelling.   Gastrointestinal:  Positive for nausea and vomiting.   Skin: Negative.    Neurological:  Positive for dizziness. Negative for light-headedness.       Objective     Vitals:    09/04/24 1144   BP: 104/64   BP Location: Left arm   Patient Position: Sitting   Cuff Size: Adult   Pulse: 81   Resp: 21   Temp: 98.4 °F (36.9 °C)   TempSrc: Infrared   SpO2: 97%   Weight: 52.2 kg (115 lb)       Physical Exam  Vitals and nursing note reviewed.   Constitutional:       General: She is not in acute distress.     Appearance: She is well-developed. She is not diaphoretic.   HENT:      Head: Normocephalic and atraumatic.      Right Ear: External ear normal.      Left Ear: External ear normal.   Eyes:      General: Lids are normal. No scleral icterus.        Right eye: No discharge.         Left eye: No discharge.      Conjunctiva/sclera: Conjunctivae normal.      Pupils: Pupils are equal, round, and reactive to light.   Neck:      Thyroid: No thyroid  mass or thyromegaly.      Vascular: No carotid bruit or JVD.      Trachea: No tracheal deviation.   Cardiovascular:      Rate and Rhythm: Normal rate and regular rhythm.      Heart sounds: Normal heart sounds. No murmur heard.     No friction rub. No gallop.   Pulmonary:      Effort: Pulmonary effort is normal. No respiratory distress.      Breath sounds: Normal breath sounds. No decreased breath sounds, wheezing, rhonchi or rales.   Chest:      Chest wall: No tenderness.   Abdominal:      General: Bowel sounds are normal. There is no distension.      Palpations: Abdomen is soft. There is no mass.      Tenderness: There is no abdominal tenderness. There is no guarding or rebound.      Hernia: No hernia is present.   Musculoskeletal:         General: Normal range of motion.   Lymphadenopathy:      Cervical: No cervical adenopathy.      Upper Body:      Right upper body: No supraclavicular adenopathy.      Left upper body: No supraclavicular adenopathy.   Skin:     Findings: No bruising, erythema or rash.      Nails: There is no clubbing.   Neurological:      Mental Status: She is alert and oriented to person, place, and time.      Cranial Nerves: No cranial nerve deficit.      Deep Tendon Reflexes: Reflexes are normal and symmetric. Reflexes normal.   Psychiatric:         Speech: Speech normal.         Behavior: Behavior normal.         Thought Content: Thought content normal.         Judgment: Judgment normal.         Assessment & Plan     Problem List Items Addressed This Visit          Infectious Diseases    Influenza A    Relevant Medications    oseltamivir (TAMIFLU) 75 MG capsule       Symptoms and Signs    Flu-like symptoms - Primary    Relevant Medications    ondansetron ODT (ZOFRAN-ODT) 4 MG disintegrating tablet    Other Relevant Orders    POCT SARS-CoV-2 Antigen KEVON    POCT Influenza A/B (Completed)     Recommend ibuprofen 600mg q6 hours.    Add tylenol 500 mg.      Zofran nausea sent to pharm.    Otc  claritin allegra or zyrtec as needed.    Increase fluids  Delsym otc.

## 2024-09-04 NOTE — LETTER
September 4, 2024     Patient: Tiny Turcios   YOB: 1965   Date of Visit: 9/4/2024       To Whom It May Concern:    It is my medical opinion that Tiny Turcios may return to work 9/9/24.         Sincerely,        Alyssa Coronel MD    CC: No Recipients

## 2024-09-23 ENCOUNTER — OFFICE VISIT (OUTPATIENT)
Dept: FAMILY MEDICINE CLINIC | Facility: CLINIC | Age: 59
End: 2024-09-23
Payer: COMMERCIAL

## 2024-09-23 VITALS
WEIGHT: 113 LBS | BODY MASS INDEX: 20.02 KG/M2 | OXYGEN SATURATION: 98 % | HEIGHT: 63 IN | HEART RATE: 80 BPM | SYSTOLIC BLOOD PRESSURE: 106 MMHG | TEMPERATURE: 98.2 F | DIASTOLIC BLOOD PRESSURE: 68 MMHG

## 2024-09-23 DIAGNOSIS — S23.8XXA SPRAIN OF CHEST WALL, INITIAL ENCOUNTER: Primary | ICD-10-CM

## 2024-09-23 DIAGNOSIS — R07.81 RIB PAIN ON LEFT SIDE: ICD-10-CM

## 2024-09-23 PROCEDURE — 99213 OFFICE O/P EST LOW 20 MIN: CPT | Performed by: PHYSICIAN ASSISTANT

## 2024-09-23 RX ORDER — PREDNISONE 10 MG/1
TABLET ORAL
Qty: 18 TABLET | Refills: 0 | Status: SHIPPED | OUTPATIENT
Start: 2024-09-23

## 2024-09-25 ENCOUNTER — TELEPHONE (OUTPATIENT)
Dept: FAMILY MEDICINE CLINIC | Facility: CLINIC | Age: 59
End: 2024-09-25
Payer: COMMERCIAL

## 2024-10-01 ENCOUNTER — TELEPHONE (OUTPATIENT)
Dept: FAMILY MEDICINE CLINIC | Facility: CLINIC | Age: 59
End: 2024-10-01
Payer: COMMERCIAL

## 2024-10-01 NOTE — TELEPHONE ENCOUNTER
Caller: Tiny Turcios    Relationship: Self    Best call back number: [phone]    Caller requesting test results: YES    What test was performed: XRAY ON RIBS    When was the test performed: LAST FRIDAY    Where was the test performed: [facility] [address].    PT REQUEST A  FOR PHONE CALL

## 2024-10-02 DIAGNOSIS — R93.89 ABNORMAL CHEST X-RAY: Primary | ICD-10-CM

## 2024-10-02 NOTE — TELEPHONE ENCOUNTER
Her xrays showed no fractures. It does look like her bones are thinning, so she does need to do a bone density test for osteoporosis. This was ordered in May by one her gynecologist I think, . Also, there was a place in her right lung that looked a little cloudy - no specific fluid or pneumonia seen but radiologist has recommended a CT scan of the chest to be safe. I will put an order in her chart for this.

## 2024-10-03 NOTE — TELEPHONE ENCOUNTER
Used  Delmi ID#[id number]. Patient notified of providers results message and verbalized understanding. Patient denies any further needs at this time.    Launch [facility] and print the 'Prescriptions from this Visit' Report

## 2024-10-07 ENCOUNTER — TELEPHONE (OUTPATIENT)
Dept: FAMILY MEDICINE CLINIC | Facility: CLINIC | Age: 59
End: 2024-10-07
Payer: COMMERCIAL

## 2024-10-07 NOTE — TELEPHONE ENCOUNTER
Caller: Tiny Turcois    Relationship: Self    Best call back number: [phone]     What orders are you requesting (i.e. lab or imaging): CT SCAN CHEST    In what timeframe would the patient need to come in: ASAP    Where will you receive your lab/imaging services: [facility]    Additional notes:

## 2024-10-07 NOTE — TELEPHONE ENCOUNTER
HUB TO RELAY    Please let patient know order for CT was faxed to [facility] on 10/3/24. She will need to call them to schedule.

## 2024-10-28 ENCOUNTER — TELEPHONE (OUTPATIENT)
Dept: FAMILY MEDICINE CLINIC | Facility: CLINIC | Age: 59
End: 2024-10-28

## 2024-10-28 NOTE — TELEPHONE ENCOUNTER
Caller: Tiny Turcios    Relationship: Self    Best call back number:        What specialty or service is being requested: CT OF CHEST     What is the provider, practice or medical service name: [facility]    What is the office location: [place]      Any additional details: PATIENT ADVISED THIS HAS NOT RECEIVED AN ORDER BY [facility] AND ALSO SHE IS REQUESTING TO HAVE ADDED THE  RIGHT RIB AREA INCLUDED  AS WELL    PLEASE CALL TO [facility] AND PATIENT IS REQUESTING AN  ON THE CALL BACK

## 2024-10-28 NOTE — TELEPHONE ENCOUNTER
Order and Auth have been re faxed to [facility]. Will follow up and make sure it gets scheduled.

## 2024-11-05 ENCOUNTER — TELEPHONE (OUTPATIENT)
Dept: FAMILY MEDICINE CLINIC | Facility: CLINIC | Age: 59
End: 2024-11-05
Payer: COMMERCIAL

## 2024-11-05 NOTE — TELEPHONE ENCOUNTER
Message from Rochelle Stewart sent at 11/4/2024  6:18 PM EST -----  Please let patient know her CT showed a fractured rib on the left, causing her pain. It does look like it has already begun healing, and should heal well, but pain may persist 6 weeks.

## 2024-11-05 NOTE — TELEPHONE ENCOUNTER
Used  Sarah, ID# [id number]. Patient notified of providers results message and verbalized understanding. Patient denies any further needs at this time.

## 2024-11-11 ENCOUNTER — TELEPHONE (OUTPATIENT)
Dept: FAMILY MEDICINE CLINIC | Facility: CLINIC | Age: 59
End: 2024-11-11

## 2024-11-11 ENCOUNTER — APPOINTMENT (OUTPATIENT)
Dept: GENERAL RADIOLOGY | Facility: HOSPITAL | Age: 59
End: 2024-11-11
Payer: COMMERCIAL

## 2024-11-11 ENCOUNTER — HOSPITAL ENCOUNTER (EMERGENCY)
Facility: HOSPITAL | Age: 59
Discharge: HOME | End: 2024-11-11
Attending: STUDENT IN AN ORGANIZED HEALTH CARE EDUCATION/TRAINING PROGRAM | Admitting: STUDENT IN AN ORGANIZED HEALTH CARE EDUCATION/TRAINING PROGRAM
Payer: COMMERCIAL

## 2024-11-11 VITALS
OXYGEN SATURATION: 100 % | BODY MASS INDEX: 21.16 KG/M2 | DIASTOLIC BLOOD PRESSURE: 76 MMHG | RESPIRATION RATE: 16 BRPM | HEART RATE: 67 BPM | WEIGHT: 115 LBS | TEMPERATURE: 97.9 F | HEIGHT: 62 IN | SYSTOLIC BLOOD PRESSURE: 112 MMHG

## 2024-11-11 DIAGNOSIS — R00.2 PALPITATIONS: Primary | ICD-10-CM

## 2024-11-11 DIAGNOSIS — Z87.898 HISTORY OF PALPITATIONS: ICD-10-CM

## 2024-11-11 DIAGNOSIS — F41.9 ANXIETY: ICD-10-CM

## 2024-11-11 LAB
ALBUMIN SERPL-MCNC: 4.4 G/DL (ref 3.5–5.2)
ALBUMIN/GLOB SERPL: 1.6 G/DL
ALP SERPL-CCNC: 69 U/L (ref 39–117)
ALT SERPL W P-5'-P-CCNC: 14 U/L (ref 1–33)
ANION GAP SERPL CALCULATED.3IONS-SCNC: 11 MMOL/L (ref 5–15)
AST SERPL-CCNC: 22 U/L (ref 1–32)
BACTERIA UR QL AUTO: NORMAL /HPF
BASOPHILS # BLD AUTO: 0.03 10*3/MM3 (ref 0–0.2)
BASOPHILS NFR BLD AUTO: 0.4 % (ref 0–1.5)
BILIRUB SERPL-MCNC: 0.3 MG/DL (ref 0–1.2)
BILIRUB UR QL STRIP: NEGATIVE
BUN SERPL-MCNC: 17 MG/DL (ref 6–20)
BUN/CREAT SERPL: 18.1 (ref 7–25)
CALCIUM SPEC-SCNC: 9.2 MG/DL (ref 8.6–10.5)
CHLORIDE SERPL-SCNC: 108 MMOL/L (ref 98–107)
CLARITY UR: CLEAR
CO2 SERPL-SCNC: 25 MMOL/L (ref 22–29)
COLOR UR: YELLOW
CREAT SERPL-MCNC: 0.94 MG/DL (ref 0.57–1)
D DIMER PPP FEU-MCNC: 0.32 MCGFEU/ML (ref 0–0.59)
DEPRECATED RDW RBC AUTO: 39.6 FL (ref 37–54)
EGFRCR SERPLBLD CKD-EPI 2021: 70 ML/MIN/1.73
EOSINOPHIL # BLD AUTO: 0.05 10*3/MM3 (ref 0–0.4)
EOSINOPHIL NFR BLD AUTO: 0.6 % (ref 0.3–6.2)
ERYTHROCYTE [DISTWIDTH] IN BLOOD BY AUTOMATED COUNT: 12.8 % (ref 12.3–15.4)
GLOBULIN UR ELPH-MCNC: 2.7 GM/DL
GLUCOSE SERPL-MCNC: 119 MG/DL (ref 65–99)
GLUCOSE UR STRIP-MCNC: NEGATIVE MG/DL
HCT VFR BLD AUTO: 41.7 % (ref 34–46.6)
HGB BLD-MCNC: 13.4 G/DL (ref 12–15.9)
HGB UR QL STRIP.AUTO: ABNORMAL
HOLD SPECIMEN: NORMAL
HYALINE CASTS UR QL AUTO: NORMAL /LPF
IMM GRANULOCYTES # BLD AUTO: 0.03 10*3/MM3 (ref 0–0.05)
IMM GRANULOCYTES NFR BLD AUTO: 0.4 % (ref 0–0.5)
KETONES UR QL STRIP: NEGATIVE
LEUKOCYTE ESTERASE UR QL STRIP.AUTO: NEGATIVE
LYMPHOCYTES # BLD AUTO: 1.18 10*3/MM3 (ref 0.7–3.1)
LYMPHOCYTES NFR BLD AUTO: 14.3 % (ref 19.6–45.3)
MAGNESIUM SERPL-MCNC: 2 MG/DL (ref 1.6–2.6)
MCH RBC QN AUTO: 27.5 PG (ref 26.6–33)
MCHC RBC AUTO-ENTMCNC: 32.1 G/DL (ref 31.5–35.7)
MCV RBC AUTO: 85.5 FL (ref 79–97)
MONOCYTES # BLD AUTO: 0.58 10*3/MM3 (ref 0.1–0.9)
MONOCYTES NFR BLD AUTO: 7 % (ref 5–12)
NEUTROPHILS NFR BLD AUTO: 6.36 10*3/MM3 (ref 1.7–7)
NEUTROPHILS NFR BLD AUTO: 77.3 % (ref 42.7–76)
NITRITE UR QL STRIP: NEGATIVE
NRBC BLD AUTO-RTO: 0 /100 WBC (ref 0–0.2)
NT-PROBNP SERPL-MCNC: <36 PG/ML (ref 0–900)
PH UR STRIP.AUTO: 7 [PH] (ref 5–8)
PHOSPHATE SERPL-MCNC: 2.7 MG/DL (ref 2.5–4.5)
PLATELET # BLD AUTO: 306 10*3/MM3 (ref 140–450)
PMV BLD AUTO: 9.5 FL (ref 6–12)
POTASSIUM SERPL-SCNC: 3.9 MMOL/L (ref 3.5–5.2)
PROT SERPL-MCNC: 7.1 G/DL (ref 6–8.5)
PROT UR QL STRIP: NEGATIVE
RBC # BLD AUTO: 4.88 10*6/MM3 (ref 3.77–5.28)
RBC # UR STRIP: NORMAL /HPF
REF LAB TEST METHOD: NORMAL
SODIUM SERPL-SCNC: 144 MMOL/L (ref 136–145)
SP GR UR STRIP: <=1.005 (ref 1–1.03)
SQUAMOUS #/AREA URNS HPF: NORMAL /HPF
T4 FREE SERPL-MCNC: 1.35 NG/DL (ref 0.92–1.68)
TROPONIN T SERPL HS-MCNC: <6 NG/L
TSH SERPL DL<=0.05 MIU/L-ACNC: 2.52 UIU/ML (ref 0.27–4.2)
UROBILINOGEN UR QL STRIP: ABNORMAL
WBC # UR STRIP: NORMAL /HPF
WBC NRBC COR # BLD AUTO: 8.23 10*3/MM3 (ref 3.4–10.8)
WHOLE BLOOD HOLD COAG: NORMAL
WHOLE BLOOD HOLD SPECIMEN: NORMAL

## 2024-11-11 PROCEDURE — 93005 ELECTROCARDIOGRAM TRACING: CPT | Performed by: STUDENT IN AN ORGANIZED HEALTH CARE EDUCATION/TRAINING PROGRAM

## 2024-11-11 PROCEDURE — 71045 X-RAY EXAM CHEST 1 VIEW: CPT

## 2024-11-11 PROCEDURE — 84439 ASSAY OF FREE THYROXINE: CPT | Performed by: STUDENT IN AN ORGANIZED HEALTH CARE EDUCATION/TRAINING PROGRAM

## 2024-11-11 PROCEDURE — 99284 EMERGENCY DEPT VISIT MOD MDM: CPT

## 2024-11-11 PROCEDURE — 84100 ASSAY OF PHOSPHORUS: CPT | Performed by: STUDENT IN AN ORGANIZED HEALTH CARE EDUCATION/TRAINING PROGRAM

## 2024-11-11 PROCEDURE — 81001 URINALYSIS AUTO W/SCOPE: CPT | Performed by: STUDENT IN AN ORGANIZED HEALTH CARE EDUCATION/TRAINING PROGRAM

## 2024-11-11 PROCEDURE — 80050 GENERAL HEALTH PANEL: CPT | Performed by: STUDENT IN AN ORGANIZED HEALTH CARE EDUCATION/TRAINING PROGRAM

## 2024-11-11 PROCEDURE — 93005 ELECTROCARDIOGRAM TRACING: CPT

## 2024-11-11 PROCEDURE — 83880 ASSAY OF NATRIURETIC PEPTIDE: CPT | Performed by: STUDENT IN AN ORGANIZED HEALTH CARE EDUCATION/TRAINING PROGRAM

## 2024-11-11 PROCEDURE — 85379 FIBRIN DEGRADATION QUANT: CPT | Performed by: STUDENT IN AN ORGANIZED HEALTH CARE EDUCATION/TRAINING PROGRAM

## 2024-11-11 PROCEDURE — 96360 HYDRATION IV INFUSION INIT: CPT

## 2024-11-11 PROCEDURE — 25810000003 LACTATED RINGERS SOLUTION: Performed by: STUDENT IN AN ORGANIZED HEALTH CARE EDUCATION/TRAINING PROGRAM

## 2024-11-11 PROCEDURE — 84484 ASSAY OF TROPONIN QUANT: CPT | Performed by: STUDENT IN AN ORGANIZED HEALTH CARE EDUCATION/TRAINING PROGRAM

## 2024-11-11 PROCEDURE — 83735 ASSAY OF MAGNESIUM: CPT | Performed by: STUDENT IN AN ORGANIZED HEALTH CARE EDUCATION/TRAINING PROGRAM

## 2024-11-11 RX ORDER — HYDROXYZINE HYDROCHLORIDE 25 MG/1
25 TABLET, FILM COATED ORAL ONCE
Status: COMPLETED | OUTPATIENT
Start: 2024-11-11 | End: 2024-11-11

## 2024-11-11 RX ORDER — SODIUM CHLORIDE 0.9 % (FLUSH) 0.9 %
10 SYRINGE (ML) INJECTION AS NEEDED
Status: DISCONTINUED | OUTPATIENT
Start: 2024-11-11 | End: 2024-11-12 | Stop reason: HOSPADM

## 2024-11-11 RX ORDER — HYDROXYZINE HYDROCHLORIDE 25 MG/1
25 TABLET, FILM COATED ORAL 3 TIMES DAILY PRN
Qty: 30 TABLET | Refills: 0 | Status: SHIPPED | OUTPATIENT
Start: 2024-11-11

## 2024-11-11 RX ADMIN — HYDROXYZINE HYDROCHLORIDE 25 MG: 25 TABLET ORAL at 17:44

## 2024-11-11 RX ADMIN — SODIUM CHLORIDE, SODIUM LACTATE, POTASSIUM CHLORIDE, CALCIUM CHLORIDE 1000 ML: 20; 30; 600; 310 INJECTION, SOLUTION INTRAVENOUS at 21:28

## 2024-11-11 NOTE — TELEPHONE ENCOUNTER
Caller: Tiny Turcios    Relationship to patient: Self    Best call back number: [phone]    Chief complaint: HEART PALPITATIONS STARTED FOUR DAYS AGO. TWO OR THREE OR TIMES A DAY    Patient directed to call 911 or go to their nearest emergency room.     Patient verbalized understanding: [] Yes  [x] No  If no, why? PT IS WORKING RIGHT NOW AND HAS NO ONE TO REPLACE HER    Additional notes:

## 2024-11-12 LAB
QT INTERVAL: 346 MS
QT INTERVAL: 358 MS
QTC INTERVAL: 408 MS
QTC INTERVAL: 423 MS

## 2024-11-12 NOTE — ED PROVIDER NOTES
EMERGENCY DEPARTMENT ENCOUNTER    Pt Name: Tiny Turcios  MRN: [id number]  Pt :   1965  Room Number:    Date of encounter:  2024  PCP: Rochelle Stewart PA-C  ED Provider: Filemon Wallis MD    Historian: Patient,       HPI:  Chief Complaint: Palpitations        Context: Tiny Turcios is a 59-year-old female with history of prior cardiac workup for palpitations who presents for recurrence of the symptoms.  She says she does have more stress recently.  She says she has been sleeping normally she denies caffeine nicotine drugs or alcohol.  She says it will come on spontaneously she has a sensation of palpitations and has also had some right-sided arm pain.  She is not currently having no symptoms.  No other complaints at this time.      PAST MEDICAL HISTORY  Past Medical History:   Diagnosis Date    Anxiety     Arthritis     arthritis/unspecified    Breast mass, right 2021    Bursitis     Frequent UTI     HSV infection     Hypercholesterolemia     Migraine     Palpitations     Renal calculi          PAST SURGICAL HISTORY  Past Surgical History:   Procedure Laterality Date     SECTION      COLONOSCOPY      KIDNEY STONE SURGERY      lithotripsy         FAMILY HISTORY  Family History   Problem Relation Age of Onset    Osteoporosis Paternal Grandfather     Osteoporosis Maternal Uncle     Stomach cancer Other     Diabetes Other     Hyperlipidemia Other     Heart disease Other     Osteoporosis Other     Breast cancer Neg Hx     Ovarian cancer Neg Hx     Uterine cancer Neg Hx     Colon cancer Neg Hx          SOCIAL HISTORY  Social History     Socioeconomic History    Marital status:     Number of children: 1   Tobacco Use    Smoking status: Never     Passive exposure: Never    Smokeless tobacco: Never    Tobacco comments:     Smoked as a child   Vaping Use    Vaping status: Never Used   Substance and Sexual Activity    Alcohol use: Not Currently      Alcohol/week: 1.0 standard drink of alcohol     Types: 1 Glasses of wine per week     Comment: ocass    Drug use: Never    Sexual activity: Not Currently     Birth control/protection: Post-menopausal         ALLERGIES  Statins, Hydrocodone, and Tamsulosin        REVIEW OF SYSTEMS  Review of Systems       All systems reviewed and negative except for those discussed in HPI.       PHYSICAL EXAM    I have reviewed the triage vital signs and nursing notes.    ED Triage Vitals [11/11/24 1658]   Temp Heart Rate Resp BP SpO2   97.9 °F (36.6 °C) 89 16 116/88 98 %      Temp src Heart Rate Source Patient Position BP Location FiO2 (%)   Oral Monitor Sitting Right arm --       Physical Exam  GENERAL:   Appears in no acute distress.   HENT: Nares patent.  EYES: No scleral icterus.  CV: Regular rhythm, regular rate.  RESPIRATORY: Normal effort.  No audible wheezes, rales or rhonchi.  ABDOMEN: Soft, nontender  MUSCULOSKELETAL: No deformities.   NEURO: Alert, moves all extremities, follows commands.  SKIN: Warm, dry, no rash visualized.      LAB RESULTS  Recent Results (from the past 24 hours)   ECG 12 Lead ED Triage Standing Order; Dysrhythmia    Collection Time: 11/11/24  5:05 PM   Result Value Ref Range    QT Interval 346 ms   Comprehensive Metabolic Panel    Collection Time: 11/11/24  5:39 PM    Specimen: Blood   Result Value Ref Range    Glucose 119 (H) 65 - 99 mg/dL    BUN 17 6 - 20 mg/dL    Creatinine 0.94 0.57 - 1.00 mg/dL    Sodium 144 136 - 145 mmol/L    Potassium 3.9 3.5 - 5.2 mmol/L    Chloride 108 (H) 98 - 107 mmol/L    CO2 25.0 22.0 - 29.0 mmol/L    Calcium 9.2 8.6 - 10.5 mg/dL    Total Protein 7.1 6.0 - 8.5 g/dL    Albumin 4.4 3.5 - 5.2 g/dL    ALT (SGPT) 14 1 - 33 U/L    AST (SGOT) 22 1 - 32 U/L    Alkaline Phosphatase 69 39 - 117 U/L    Total Bilirubin 0.3 0.0 - 1.2 mg/dL    Globulin 2.7 gm/dL    A/G Ratio 1.6 g/dL    BUN/Creatinine Ratio 18.1 7.0 - 25.0    Anion Gap 11.0 5.0 - 15.0 mmol/L    eGFR 70.0 >60.0  mL/min/1.73   Magnesium    Collection Time: 11/11/24  5:39 PM    Specimen: Blood   Result Value Ref Range    Magnesium 2.0 1.6 - 2.6 mg/dL   Single High Sensitivity Troponin T    Collection Time: 11/11/24  5:39 PM    Specimen: Blood   Result Value Ref Range    HS Troponin T <6 <14 ng/L   TSH    Collection Time: 11/11/24  5:39 PM    Specimen: Blood   Result Value Ref Range    TSH 2.520 0.270 - 4.200 uIU/mL   BNP    Collection Time: 11/11/24  5:39 PM    Specimen: Blood   Result Value Ref Range    proBNP <36.0 0.0 - 900.0 pg/mL   Green Top (Gel)    Collection Time: 11/11/24  5:39 PM   Result Value Ref Range    Extra Tube Hold for add-ons.    Lavender Top    Collection Time: 11/11/24  5:39 PM   Result Value Ref Range    Extra Tube hold for add-on    Gold Top - SST    Collection Time: 11/11/24  5:39 PM   Result Value Ref Range    Extra Tube Hold for add-ons.    Gray Top    Collection Time: 11/11/24  5:39 PM   Result Value Ref Range    Extra Tube Hold for add-ons.    Light Blue Top    Collection Time: 11/11/24  5:39 PM   Result Value Ref Range    Extra Tube Hold for add-ons.    CBC Auto Differential    Collection Time: 11/11/24  5:39 PM    Specimen: Blood   Result Value Ref Range    WBC 8.23 3.40 - 10.80 10*3/mm3    RBC 4.88 3.77 - 5.28 10*6/mm3    Hemoglobin 13.4 12.0 - 15.9 g/dL    Hematocrit 41.7 34.0 - 46.6 %    MCV 85.5 79.0 - 97.0 fL    MCH 27.5 26.6 - 33.0 pg    MCHC 32.1 31.5 - 35.7 g/dL    RDW 12.8 12.3 - 15.4 %    RDW-SD 39.6 37.0 - 54.0 fl    MPV 9.5 6.0 - 12.0 fL    Platelets 306 140 - 450 10*3/mm3    Neutrophil % 77.3 (H) 42.7 - 76.0 %    Lymphocyte % 14.3 (L) 19.6 - 45.3 %    Monocyte % 7.0 5.0 - 12.0 %    Eosinophil % 0.6 0.3 - 6.2 %    Basophil % 0.4 0.0 - 1.5 %    Immature Grans % 0.4 0.0 - 0.5 %    Neutrophils, Absolute 6.36 1.70 - 7.00 10*3/mm3    Lymphocytes, Absolute 1.18 0.70 - 3.10 10*3/mm3    Monocytes, Absolute 0.58 0.10 - 0.90 10*3/mm3    Eosinophils, Absolute 0.05 0.00 - 0.40 10*3/mm3     Basophils, Absolute 0.03 0.00 - 0.20 10*3/mm3    Immature Grans, Absolute 0.03 0.00 - 0.05 10*3/mm3    nRBC 0.0 0.0 - 0.2 /100 WBC   D-dimer, Quantitative    Collection Time: 11/11/24  5:39 PM    Specimen: Blood   Result Value Ref Range    D-Dimer, Quantitative 0.32 0.00 - 0.59 MCGFEU/mL   T4, Free    Collection Time: 11/11/24  5:39 PM    Specimen: Blood   Result Value Ref Range    Free T4 1.35 0.92 - 1.68 ng/dL   Phosphorus    Collection Time: 11/11/24  5:39 PM    Specimen: Blood   Result Value Ref Range    Phosphorus 2.7 2.5 - 4.5 mg/dL   Urinalysis With Microscopic If Indicated (No Culture) - Urine, Clean Catch    Collection Time: 11/11/24  5:42 PM    Specimen: Urine, Clean Catch   Result Value Ref Range    Color, UA Yellow Yellow, Straw    Appearance, UA Clear Clear    pH, UA 7.0 5.0 - 8.0    Specific Gravity, UA <=1.005 1.001 - 1.030    Glucose, UA Negative Negative    Ketones, UA Negative Negative    Bilirubin, UA Negative Negative    Blood, UA Trace (A) Negative    Protein, UA Negative Negative    Leuk Esterase, UA Negative Negative    Nitrite, UA Negative Negative    Urobilinogen, UA 0.2 E.U./dL 0.2 - 1.0 E.U./dL   Urinalysis, Microscopic Only - Urine, Clean Catch    Collection Time: 11/11/24  5:42 PM    Specimen: Urine, Clean Catch   Result Value Ref Range    RBC, UA 0-2 None Seen, 0-2 /HPF    WBC, UA 0-2 None Seen, 0-2 /HPF    Bacteria, UA None Seen None Seen, Trace /HPF    Squamous Epithelial Cells, UA 0-2 None Seen, 0-2 /HPF    Hyaline Casts, UA 0-6 0 - 6 /LPF    Methodology Manual Light Microscopy    ECG 12 Lead Tachycardia    Collection Time: 11/11/24  8:50 PM   Result Value Ref Range    QT Interval 358 ms       If labs were ordered, I independently reviewed the results and considered them in treating the patient.        RADIOLOGY  XR Chest 1 View    Result Date: 11/11/2024  XR CHEST 1 VW Date of Exam: 11/11/2024 7:39 PM EST Indication: Dysrhythmia triage protocol Comparison: None available. Findings:  Lungs are well expanded and appear clear. No pneumothorax or large pleural effusion is seen. Heart size is within normal limits.     Impression: No acute cardiopulmonary abnormality is identified. Electronically Signed: Deedee Rangel  11/11/2024 8:14 PM EST  Workstation ID: [id number]     I ordered and independently reviewed the above noted radiographic studies.      I viewed images of chest x-ray which showed no acute pathology per my independent interpretation.    See radiologist's dictation for official interpretation.        PROCEDURES    Procedures    ECG 12 Lead Tachycardia   Preliminary Result   Test Reason : Tachycardia   Blood Pressure :   */*   mmHG   Vent. Rate :  84 BPM     Atrial Rate :  84 BPM      P-R Int : 148 ms          QRS Dur :  76 ms       QT Int : 358 ms       P-R-T Axes :  80  66  63 degrees     QTcB Int : 423 ms      Normal sinus rhythm   Normal ECG   When compared with ECG of 11-Nov-2024 17:05, (Unconfirmed)   No significant change was found      Referred By: EDMD           Confirmed By:       ECG 12 Lead ED Triage Standing Order; Dysrhythmia   Preliminary Result   Test Reason : ED Triage Standing Order~   Blood Pressure :   */*   mmHG   Vent. Rate :  84 BPM     Atrial Rate :  84 BPM      P-R Int : 148 ms          QRS Dur :  82 ms       QT Int : 346 ms       P-R-T Axes :  70  59  54 degrees     QTcB Int : 408 ms      Normal sinus rhythm   Normal ECG   No previous ECGs available      Referred By: EDMD           Confirmed By:           MEDICATIONS GIVEN IN ER    Medications   lactated ringers bolus 1,000 mL (0 mL Intravenous Stopped 11/11/24 2348)   hydrOXYzine (ATARAX) tablet 25 mg (25 mg Oral Given 11/11/24 5194)         MEDICAL DECISION MAKING, PROGRESS, and CONSULTS    All labs, if obtained, have been independently reviewed by me.  All radiology studies, if obtained, have been reviewed by me and the radiologist dictating the report.  All EKG's, if obtained, have been independently viewed and  interpreted by me/my attending physician.      Discussion below represents my analysis of pertinent findings related to patient's condition, differential diagnosis, treatment plan and final disposition.                             Differential diagnosis:    Cardiac arrhythmia, myocardial infarction, pulmonary embolism, hyperthyroidism, anemia, electrolyte abnormality, anxiety      Additional sources:    - Discussed/ obtained information from independent historians:     - External (non-ED) record review:  Chart review of cardiology notes for palpitations shows history of:  1. Palpitations  1. Echocardiogram October 26, 2022 EF 60% normal RVSP  otherwise no significant valvular heart disease normal.  2. ZIO monitor October 10, 2022.  7.4% PVC burden single triggered event associated PVC.  Short runs of atrial tachycardia without symptoms     2. Gastritis  3. Osteoporosis  4. Anxiety  5. Weight loss loss  6. Marginal blood pressure    - Chronic or social conditions impacting care:          Orders placed during this visit:  Orders Placed This Encounter   Procedures    XR Chest 1 View    [place] Draw    Comprehensive Metabolic Panel    Magnesium    Single High Sensitivity Troponin T    TSH    BNP    CBC Auto Differential    Urinalysis With Microscopic If Indicated (No Culture) - Urine, Clean Catch    D-dimer, Quantitative    T4, Free    Phosphorus    Urinalysis, Microscopic Only - Urine, Clean Catch    Ambulatory Referral to [facility]    Continuous Pulse Oximetry    ECG 12 Lead ED Triage Standing Order; Dysrhythmia    ECG 12 Lead Tachycardia    CBC & Differential    Green Top (Gel)    Lavender Top    Gold Top - SST    Gray Top    Light Blue Top         Additional orders considered but not ordered:      ED Course:    Consultants:      ED Course as of 11/12/24 0220   Mon Nov 11, 2024   1726 Chart review of cardiology notes for palpitations shows history of:  1. Palpitations  1. Echocardiogram October  26, 2022 EF 60% normal RVSP  otherwise no significant valvular heart disease normal.  2. ZIO monitor October 10, 2022.  7.4% PVC burden single triggered event associated PVC.  Short runs of atrial tachycardia without symptoms     2. Gastritis  3. Osteoporosis  4. Anxiety  5. Weight loss loss  6. Marginal blood pressure   [CC]   1733 This a very nice 59-year-old female with history of prior cardiac workup for palpitations who presents for recurrence of the symptoms.  She says she does have more stress recently.  She says she has been sleeping normally she denies caffeine nicotine drugs or alcohol.  She says it will come on spontaneously she has a sensation of palpitations and has also had some right-sided arm pain.  She is not currently having no symptoms.  No other complaints at this time. [CC]   1739 She arrived awake and alert [CC]   1739  vitals within normal limits initial ECG shows no abnormalities however she was not having symptoms at the time that it was obtained.  She generally looks well.  She is accompanied by her  who helps with the history he is also able to provide a Holter monitor report that does show that she at 1 point during the 2 weeks of wearing a Holter monitor did have a 10 beat run of SVT.  They say there was discussion of starting her on propranolol at that time but because she has baseline low blood pressure they elected to defer that.  Have concern for cardiac arrhythmia hypothyroidism electrolyte abnormality pulmonary embolism etc. obtaining full cardiac and laboratory workup will reevaluate pending initial workup. [CC]   Tue Nov 12, 2024   0219 Chest x-ray not showing any acute pathology.  CBC and CMP reassuring and nonactionable.  No elevation in D-dimer pointing away from pulmonary embolism  No elevation in high-sensitivity troponin  Normal thyroid function  No elevation in proBNP.  Urinalysis is clean.  Upon reevaluation after the IV fluids and hydroxyzine she is having  significant improvement in her symptoms think there may be an anxiety component to this we have discussed her trying hydroxyzine outpatient to have also given cardiology referral.  Counseled on return precautions verbally expressed understanding of these [CC]      ED Course User Index  [CC] Filemon Wallis MD              Shared Decision Making:  After my consideration of clinical presentation and any laboratory/radiology studies obtained, I discussed the findings with the patient/patient representative who is in agreement with the treatment plan and the final disposition.   Risks and benefits of discharge and/or observation/admission were discussed.       AS OF 02:20 EST VITALS:    BP - 112/76  HR - 67  TEMP - 97.9 °F (36.6 °C) (Oral)  O2 SATS - 100%                  DIAGNOSIS  Final diagnoses:   Palpitations   Anxiety   History of palpitations         DISPOSITION  [email or web address]      Please note that portions of this document were completed with voice recognition software.        Filemon Wallis MD  11/12/24 1493

## 2024-11-12 NOTE — DISCHARGE INSTRUCTIONS
Use the provided medicine as needed for episodes of anxiety.  You will be contacted with cardiology follow-up.  While today's workup was reassuring if symptoms change or worsen please return to the ED or seek other medical care.

## 2024-11-26 ENCOUNTER — OFFICE VISIT (OUTPATIENT)
Dept: CARDIOLOGY | Facility: HOSPITAL | Age: 59
End: 2024-11-26
Payer: COMMERCIAL

## 2024-11-26 ENCOUNTER — HOSPITAL ENCOUNTER (OUTPATIENT)
Dept: CARDIOLOGY | Facility: HOSPITAL | Age: 59
Discharge: HOME | End: 2024-11-26
Payer: COMMERCIAL

## 2024-11-26 VITALS
HEART RATE: 76 BPM | DIASTOLIC BLOOD PRESSURE: 77 MMHG | SYSTOLIC BLOOD PRESSURE: 117 MMHG | TEMPERATURE: 96 F | RESPIRATION RATE: 16 BRPM | HEIGHT: 63 IN | BODY MASS INDEX: 20.48 KG/M2 | OXYGEN SATURATION: 99 % | WEIGHT: 115.6 LBS

## 2024-11-26 DIAGNOSIS — R00.2 PALPITATIONS: ICD-10-CM

## 2024-11-26 DIAGNOSIS — R00.2 PALPITATIONS: Primary | ICD-10-CM

## 2024-11-26 DIAGNOSIS — F41.9 ANXIETY: ICD-10-CM

## 2024-11-26 PROCEDURE — 93242 EXT ECG>48HR<7D RECORDING: CPT

## 2024-11-26 NOTE — PROGRESS NOTES
[facility] Heart Monitor Documentation    Tiny Turcios  1965  [id number]  11/26/24      [x] ZIO XT Patch  Model [id number] Prescribed for  Days    Serial Number: (N + 9 Digits) [id number]   Apply-By Date on Box: 04/12/2025  USPS Tracking Number: [id number]  USPS Tracking        [] [employer] MINI PLUS Mobile Cardiac Telemetry  Model BGMINIPLUS Prescribed for  Days    Serial Number: (BGM + 7 Digits) BGM  Shipped-By Date on Box:   UPS Tracking Number: [id number]  UPS Tracking      [] [employer] MINI Holter Monitor  Model BGMINIEL Prescribed for Days    Serial Number: (7 Digits)   Shipped-By Date on Box:   UPS Tracking Number: [id number]  UPS Tracking        This monitor was applied to the patient's chest and checked for proper functioning.  Ms. Tiny Turcios was instructed in the proper use of this monitor.  She was given the opportunity to ask questions and left the office with the device 's instruction manual.    Chichi Silverio, [facility], 08:30 EST, 11/26/24                  [facility] 8[facility]8.[facility]

## 2024-11-26 NOTE — PROGRESS NOTES
Chief Complaint  Palpitations    Subjective      History of Present Illness {  Problem List  Visit  Diagnosis   Encounters  Notes  Medications  Labs  Result Review Imaging  Media :23}     Tiny Turcios, 59 y.o. female presents to [facility] for Palpitations    Cardiac PMH:   Palpitations  Echocardiogram October 26, 2022 EF 60% normal RVSP  otherwise no significant valvular heart disease normal.  ZIO monitor October 10, 2022.  7.4% PVC burden single triggered event associated PVC.  Short runs of atrial tachycardia without symptoms  Gastritis  Osteoporosis  Anxiety  Weight loss loss  Marginal blood pressure    HPI:  Patient was recently evaluated at [facility] emergency department for complaints of palpitations in setting of increased stress.  Emergency department work-up revealed normal troponin/BNP, CXR with no acute cardiopulmonary findings, and ECGs with no evidence of acute coronary syndrome. Patient was instructed to follow-up at [facility] for further evaluation.    Patient states that palpitations started 2 years ago. 2 recurrent episodes since ED evaluation. Symptoms are worse than workup 2 years ago. Describes symptoms as jumping and also rapid heartbeat. Symptoms last for approximately 5-10 minutes. Lightheadedness but no syncope.  Symptoms improve with hydroxyzine use. Previous cardiac workup for symptoms include: TTE 2022 with preserved LVEF, Holter monitor 2022 with PVC burden 7%/short runs of atrial tachycardia without symptoms. Caffeine use: now only decaf, alcohol use: 1 glass of wine occasionally, family history or arrhythmia includes: father with cardiac arrhythmia-unknown exact diagnosis.  No recent illness or medication change.       Objective     Vital Signs:   Vitals:    11/26/24 0807 11/26/24 0808   BP: 114/80 117/77   BP Location: Left arm Left arm   Patient Position: Standing Sitting   Cuff Size:  "Adult Adult   Pulse: 79 76   Resp:  16   Temp: 96 °F (35.6 °C) 96 °F (35.6 °C)   TempSrc: Temporal Temporal   SpO2: 99% 99%   Weight:  52.4 kg (115 lb 9.6 oz)   Height:  160 cm (63\")     Body mass index is 20.48 kg/m².  Physical Exam  Vitals and nursing note reviewed.   Constitutional:       Appearance: Normal appearance.   HENT:      Head: Normocephalic.   Eyes:      Extraocular Movements: Extraocular movements intact.   Neck:      Vascular: No carotid bruit.   Cardiovascular:      Rate and Rhythm: Normal rate and regular rhythm.      Pulses: Normal pulses.      Heart sounds: Normal heart sounds, S1 normal and S2 normal. No murmur heard.  Pulmonary:      Effort: Pulmonary effort is normal. No respiratory distress.      Breath sounds: Normal breath sounds.   Musculoskeletal:      Cervical back: Neck supple.      Right lower leg: No edema.      Left lower leg: No edema.   Skin:     General: Skin is warm and dry.   Neurological:      General: No focal deficit present.      Mental Status: She is alert.   Psychiatric:         Mood and Affect: Mood normal.         Behavior: Behavior normal.         Thought Content: Thought content normal.       Data Reviewed:{ Labs  Result Review  Imaging  Med Tab  Media :23}     T4, Free (11/11/2024 17:39)  D-dimer, Quantitative (11/11/2024 17:39)  BNP (11/11/2024 17:39)  TSH (11/11/2024 17:39)  Single High Sensitivity Troponin T (11/11/2024 17:39)  Magnesium (11/11/2024 17:39)  Comprehensive Metabolic Panel (11/11/2024 17:39)  CBC & Differential (11/11/2024 17:39)  XR Chest 1 View (11/11/2024 20:01)   ECG 12 Lead Tachycardia (11/11/2024 20:50)  ECG 12 Lead ED Triage Standing Order; Dysrhythmia (11/11/2024 17:05)    Holter Monitor - 72 Hour Up To 15 Days (11/01/2022 09:28)  Adult Transthoracic Echo Complete W/ Cont if Necessary Per Protocol (10/26/2022 08:45)      Assessment & Plan   Assessment and Plan {CC Problem List  Visit Diagnosis  ROS  Review (Popup)  Health Maintenance "  Quality  BestPractice  Medications  SmartSets  SnapShot Encounters  Media :23}       1. Palpitations  -Recent ED evaluation for palpitations  -Overall unremarkable workup; ECG with no evidence of arrhythmia. No syncope.   -Symptoms do improve with hydroxyzine; increased stress lately  -Prior TTE with preserved LVEF, no significant valvular abnormality  -Holter Monitor - 14 Days; Future  -Continue hydroxyzine for stress/anxiety  -f/u in 1 month for monitor results, symptom check    2. Anxiety  -Increased stress lately  -Reports hydroxyzine helps with symptoms  -Continue hydroxyzine as prescribed, f/u with PCP for further adjustments if needed.       Follow Up {Instructions Charge Capture  Follow-up Communications :23}     Return in about 1 month (around 12/26/2024) for Office follow-up, Monitor results, Recheck.      Patient was given instructions and counseling regarding her condition or for health maintenance advice. Please see specific information pulled into the AVS if appropriate. Patient was instructed to call the [facility] with any questions, concerns, or worsening symptoms.    Dictated Utilizing Dragon Dictation   Please note that portions of this note were completed with a voice recognition program. Part of this note may be an electronic transcription/translation of spoken language to printed text using the Dragon Dictation System.

## 2024-12-05 ENCOUNTER — TELEPHONE (OUTPATIENT)
Dept: FAMILY MEDICINE CLINIC | Facility: CLINIC | Age: 59
End: 2024-12-05
Payer: COMMERCIAL

## 2024-12-05 NOTE — TELEPHONE ENCOUNTER
Caller: Tiny Turcios    Relationship: Self    Best call back number: [phone]     What medication are you requesting: HEART MONITOR RESULTS SHOWED MEDICATION NEEDED    What are your current symptoms: HEART MONITOR RESULTS    If a prescription is needed, what is your preferred pharmacy and phone number:  [facility] [address] [phone] [facility] [phone]      Additional notes: PROVIDER STATED MEDICATION WAS NEEDED FOLLOWING HEART MONITOR RESULTS, PHARMACY HAS NOT RECEIVED. PLEASE CALL PATIENT TO ADVISE.

## 2024-12-05 NOTE — TELEPHONE ENCOUNTER
Reviewing patient chart patient is wear Holter monitor from November 26 until December 10, 2024. No medication mentioned in provider office note until monitor results are back.  Pt does have a medication that is prescribed by her primary care provider, unsure if patient needs refill of that medication.   Attempted to contact patient using [employer] no answer.

## 2024-12-20 LAB
CV ZIO BASELINE AVG BPM: 72 BPM
CV ZIO BASELINE BPM HIGH: 127 BPM
CV ZIO BASELINE BPM LOW: 49 BPM
CV ZIO DEVICE ANALYSIS TIME: NORMAL
CV ZIO ECT SVE COUNT: 171 EPISODES
CV ZIO ECT SVE CPLT COUNT: 16 EPISODES
CV ZIO ECT SVE CPLT FREQ: NORMAL
CV ZIO ECT SVE FREQ: NORMAL
CV ZIO ECT SVE TPLT COUNT: 5 EPISODES
CV ZIO ECT SVE TPLT FREQ: NORMAL
CV ZIO ECT VE COUNT: 225 EPISODES
CV ZIO ECT VE CPLT COUNT: 14 EPISODES
CV ZIO ECT VE CPLT FREQ: NORMAL
CV ZIO ECT VE FREQ: NORMAL
CV ZIO ECT VE TPLT COUNT: 0 EPISODES
CV ZIO ECT VE TPLT FREQ: 0
CV ZIO ECTOPIC SVE COUPLET RAW PERCENT: 0 %
CV ZIO ECTOPIC SVE ISOLATED PERCENT: 0.01 %
CV ZIO ECTOPIC SVE TRIPLET RAW PERCENT: 0 %
CV ZIO ECTOPIC VE COUPLET RAW PERCENT: 0 %
CV ZIO ECTOPIC VE ISOLATED PERCENT: 0.02 %
CV ZIO ECTOPIC VE TRIPLET RAW PERCENT: 0 %
CV ZIO ENROLLMENT END: NORMAL
CV ZIO ENROLLMENT START: NORMAL
CV ZIO L BIGEMINY DUR: 5.1 SEC
CV ZIO L BIGEMINY END: NORMAL
CV ZIO L BIGEMINY START: NORMAL
CV ZIO L TRIGEMINY DUR: 5.2 SEC
CV ZIO L TRIGEMINY END: NORMAL
CV ZIO L TRIGEMINY START: NORMAL
CV ZIO PATIENT EVENTS DIARIES: 10
CV ZIO PATIENT EVENTS TRIGGERS: 32
CV ZIO PAUSE COUNT: 0
CV ZIO PRESCRIPTION STATUS: NORMAL
CV ZIO SVT COUNT: 0
CV ZIO TOTAL  ENROLLMENT PERIOD: NORMAL
CV ZIO VT COUNT: 0

## 2025-01-13 ENCOUNTER — OFFICE VISIT (OUTPATIENT)
Dept: CARDIOLOGY | Facility: HOSPITAL | Age: 60
End: 2025-01-13
Payer: COMMERCIAL

## 2025-01-13 VITALS
RESPIRATION RATE: 18 BRPM | OXYGEN SATURATION: 99 % | HEIGHT: 63 IN | WEIGHT: 115.13 LBS | DIASTOLIC BLOOD PRESSURE: 72 MMHG | HEART RATE: 71 BPM | SYSTOLIC BLOOD PRESSURE: 114 MMHG | BODY MASS INDEX: 20.4 KG/M2

## 2025-01-13 PROCEDURE — 99214 OFFICE O/P EST MOD 30 MIN: CPT | Performed by: NURSE PRACTITIONER

## 2025-01-13 NOTE — PROGRESS NOTES
"Chief Complaint  Follow-up and Palpitations    Subjective      History of Present Illness {CC  Problem List  Visit  Diagnosis   Encounters  Notes  Medications  Labs  Result Review Imaging  Media :23}     Tiny Turcios, 59 y.o. female presents to [facility] for Follow-up and Palpitations    Cardiac PMH:   Palpitations  Echocardiogram October 26, 2022 EF 60% normal RVSP  otherwise no significant valvular heart disease normal.  ZIO monitor October 10, 2022.  7.4% PVC burden single triggered event associated PVC.  Short runs of atrial tachycardia without symptoms  Gastritis  Osteoporosis  Anxiety  Weight loss loss  Marginal blood pressure    HPI:  Since previous evaluation patient completed cardiac monitor that reported as normal monitor study. No significant arrhythmia reported on monitor; no atrial fibrillation, VT, pauses, AVB, SVT.  Heart rate average 72, minimum 49, maximum 127.  Low burden PAC/PVC less than 1%.    Since previous evaluation palpitations are essentially resolved. No palpitations for the past few weeks now with stress reduction.  Overall feeling well from a cardiovascular standpoint, stress improved after recent vacation to visit family.  No exertional chest pain or dyspnea.       Objective     Vital Signs:   Vitals:    01/13/25 0856   BP: 114/72   BP Location: Left arm   Patient Position: Sitting   Cuff Size: Small Adult   Pulse: 71   Resp: 18   SpO2: 99%   Weight: 52.2 kg (115 lb 2 oz)   Height: 160 cm (63\")     Body mass index is 20.39 kg/m².  Physical Exam  Vitals and nursing note reviewed.   Constitutional:       Appearance: Normal appearance.   HENT:      Head: Normocephalic.   Eyes:      Extraocular Movements: Extraocular movements intact.   Neck:      Vascular: No carotid bruit.   Cardiovascular:      Rate and Rhythm: Normal rate and regular rhythm.      Pulses: Normal pulses.      Heart sounds: Normal heart sounds, S1 normal and S2 normal. " No murmur heard.  Pulmonary:      Effort: Pulmonary effort is normal. No respiratory distress.      Breath sounds: Normal breath sounds.   Musculoskeletal:      Cervical back: Neck supple.      Right lower leg: No edema.      Left lower leg: No edema.   Skin:     General: Skin is warm and dry.   Neurological:      General: No focal deficit present.      Mental Status: She is alert.   Psychiatric:         Mood and Affect: Mood normal.         Behavior: Behavior normal.         Thought Content: Thought content normal.       Data Reviewed:{ Labs  Result Review  Imaging  Med Tab  Media :23}     T4, Free (11/11/2024 17:39)  D-dimer, Quantitative (11/11/2024 17:39)  BNP (11/11/2024 17:39)  TSH (11/11/2024 17:39)  Single High Sensitivity Troponin T (11/11/2024 17:39)  Magnesium (11/11/2024 17:39)  Comprehensive Metabolic Panel (11/11/2024 17:39)  CBC & Differential (11/11/2024 17:39)  XR Chest 1 View (11/11/2024 20:01)   ECG 12 Lead Tachycardia (11/11/2024 20:50)  ECG 12 Lead ED Triage Standing Order; Dysrhythmia (11/11/2024 17:05)    Holter Monitor - 72 Hour Up To 15 Days (11/01/2022 09:28)  Adult Transthoracic Echo Complete W/ Cont if Necessary Per Protocol (10/26/2022 08:45)      Assessment & Plan   Assessment and Plan {CC Problem List  Visit Diagnosis  ROS  Review (Popup)  Health Maintenance  Quality  BestPractice  Medications  SmartSets  SnapShot Encounters  Media :23}       1. Palpitations  -Recent ED evaluation for palpitations  -Overall unremarkable workup; ECG with no evidence of arrhythmia. No syncope.   -Symptoms do improve with hydroxyzine; increased stress lately  -Prior TTE with preserved LVEF, no significant valvular abnormality  -Recent cardiac monitor reported as normal monitor study. No significant arrhythmia reported on monitor; no atrial fibrillation, VT, pauses, AVB, SVT.  Heart rate average 72, minimum 49, maximum 127.  Low burden PAC/PVC less than 1%.  -Continue hydroxyzine for  stress/anxiety  -Discussed stress reduction as this worsens her palpitation symptoms.    2. Anxiety  -Stress improved, palpitations now improved with stress reduction  -Reports hydroxyzine helps with symptoms  -Follow-up with PCP for stress/anxiety given these are generally etiology of her palpitation symptoms.      Follow Up {Instructions Charge Capture  Follow-up Communications :23}     Return if symptoms worsen or fail to improve.      Patient was given instructions and counseling regarding her condition or for health maintenance advice. Please see specific information pulled into the AVS if appropriate. Patient was instructed to call the [facility] with any questions, concerns, or worsening symptoms.    Dictated Utilizing Dragon Dictation   Please note that portions of this note were completed with a voice recognition program. Part of this note may be an electronic transcription/translation of spoken language to printed text using the Dragon Dictation System.

## 2025-02-07 ENCOUNTER — HOSPITAL ENCOUNTER (OUTPATIENT)
Dept: MAMMOGRAPHY | Facility: HOSPITAL | Age: 60
Discharge: HOME | End: 2025-02-07
Admitting: ADVANCED PRACTICE MIDWIFE
Payer: COMMERCIAL

## 2025-02-07 DIAGNOSIS — Z12.31 SCREENING MAMMOGRAM FOR BREAST CANCER: ICD-10-CM

## 2025-02-07 PROCEDURE — 77063 BREAST TOMOSYNTHESIS BI: CPT

## 2025-02-07 PROCEDURE — 77067 SCR MAMMO BI INCL CAD: CPT

## 2025-05-23 ENCOUNTER — LAB (OUTPATIENT)
Dept: LAB | Facility: HOSPITAL | Age: 60
End: 2025-05-23
Payer: COMMERCIAL

## 2025-05-23 ENCOUNTER — OFFICE VISIT (OUTPATIENT)
Dept: FAMILY MEDICINE CLINIC | Facility: CLINIC | Age: 60
End: 2025-05-23
Payer: COMMERCIAL

## 2025-05-23 VITALS
WEIGHT: 118.2 LBS | DIASTOLIC BLOOD PRESSURE: 68 MMHG | OXYGEN SATURATION: 99 % | BODY MASS INDEX: 20.94 KG/M2 | TEMPERATURE: 97.9 F | HEIGHT: 63 IN | SYSTOLIC BLOOD PRESSURE: 102 MMHG | HEART RATE: 72 BPM

## 2025-05-23 DIAGNOSIS — E55.9 VITAMIN D DEFICIENCY: ICD-10-CM

## 2025-05-23 DIAGNOSIS — Z00.00 ANNUAL PHYSICAL EXAM: Primary | ICD-10-CM

## 2025-05-23 DIAGNOSIS — Z23 IMMUNIZATION DUE: ICD-10-CM

## 2025-05-23 DIAGNOSIS — Z00.00 ANNUAL PHYSICAL EXAM: ICD-10-CM

## 2025-05-23 LAB
25(OH)D3 SERPL-MCNC: 26.4 NG/ML (ref 30–100)
ALBUMIN SERPL-MCNC: 4.3 G/DL (ref 3.5–5.2)
ALBUMIN/GLOB SERPL: 1.7 G/DL
ALP SERPL-CCNC: 72 U/L (ref 39–117)
ALT SERPL W P-5'-P-CCNC: 18 U/L (ref 1–33)
ANION GAP SERPL CALCULATED.3IONS-SCNC: 10.8 MMOL/L (ref 5–15)
AST SERPL-CCNC: 29 U/L (ref 1–32)
BASOPHILS # BLD AUTO: 0.02 10*3/MM3 (ref 0–0.2)
BASOPHILS NFR BLD AUTO: 0.4 % (ref 0–1.5)
BILIRUB SERPL-MCNC: 0.4 MG/DL (ref 0–1.2)
BUN SERPL-MCNC: 16 MG/DL (ref 6–20)
BUN/CREAT SERPL: 20.5 (ref 7–25)
CALCIUM SPEC-SCNC: 9.2 MG/DL (ref 8.6–10.5)
CHLORIDE SERPL-SCNC: 106 MMOL/L (ref 98–107)
CHOLEST SERPL-MCNC: 248 MG/DL (ref 0–200)
CO2 SERPL-SCNC: 23.2 MMOL/L (ref 22–29)
CREAT SERPL-MCNC: 0.78 MG/DL (ref 0.57–1)
DEPRECATED RDW RBC AUTO: 39 FL (ref 37–54)
EGFRCR SERPLBLD CKD-EPI 2021: 87.6 ML/MIN/1.73
EOSINOPHIL # BLD AUTO: 0.04 10*3/MM3 (ref 0–0.4)
EOSINOPHIL NFR BLD AUTO: 0.9 % (ref 0.3–6.2)
ERYTHROCYTE [DISTWIDTH] IN BLOOD BY AUTOMATED COUNT: 12.1 % (ref 12.3–15.4)
GLOBULIN UR ELPH-MCNC: 2.6 GM/DL
GLUCOSE SERPL-MCNC: 79 MG/DL (ref 65–99)
HBA1C MFR BLD: 5.2 % (ref 4.8–5.6)
HCT VFR BLD AUTO: 45 % (ref 34–46.6)
HDLC SERPL-MCNC: 68 MG/DL (ref 40–60)
HGB BLD-MCNC: 14.1 G/DL (ref 12–15.9)
IMM GRANULOCYTES # BLD AUTO: 0.01 10*3/MM3 (ref 0–0.05)
IMM GRANULOCYTES NFR BLD AUTO: 0.2 % (ref 0–0.5)
LDLC SERPL CALC-MCNC: 175 MG/DL (ref 0–100)
LDLC/HDLC SERPL: 2.54 {RATIO}
LYMPHOCYTES # BLD AUTO: 1.79 10*3/MM3 (ref 0.7–3.1)
LYMPHOCYTES NFR BLD AUTO: 38.9 % (ref 19.6–45.3)
MCH RBC QN AUTO: 27.5 PG (ref 26.6–33)
MCHC RBC AUTO-ENTMCNC: 31.3 G/DL (ref 31.5–35.7)
MCV RBC AUTO: 87.9 FL (ref 79–97)
MONOCYTES # BLD AUTO: 0.48 10*3/MM3 (ref 0.1–0.9)
MONOCYTES NFR BLD AUTO: 10.4 % (ref 5–12)
NEUTROPHILS NFR BLD AUTO: 2.26 10*3/MM3 (ref 1.7–7)
NEUTROPHILS NFR BLD AUTO: 49.2 % (ref 42.7–76)
NRBC BLD AUTO-RTO: 0 /100 WBC (ref 0–0.2)
PLATELET # BLD AUTO: 281 10*3/MM3 (ref 140–450)
PMV BLD AUTO: 10.5 FL (ref 6–12)
POTASSIUM SERPL-SCNC: 4.3 MMOL/L (ref 3.5–5.2)
PROT SERPL-MCNC: 6.9 G/DL (ref 6–8.5)
RBC # BLD AUTO: 5.12 10*6/MM3 (ref 3.77–5.28)
SODIUM SERPL-SCNC: 140 MMOL/L (ref 136–145)
T4 FREE SERPL-MCNC: 1.21 NG/DL (ref 0.92–1.68)
TRIGL SERPL-MCNC: 36 MG/DL (ref 0–150)
TSH SERPL DL<=0.05 MIU/L-ACNC: 3.04 UIU/ML (ref 0.27–4.2)
VIT B12 BLD-MCNC: 685 PG/ML (ref 211–946)
VLDLC SERPL-MCNC: 5 MG/DL (ref 5–40)
WBC NRBC COR # BLD AUTO: 4.6 10*3/MM3 (ref 3.4–10.8)

## 2025-05-23 PROCEDURE — 83036 HEMOGLOBIN GLYCOSYLATED A1C: CPT

## 2025-05-23 PROCEDURE — 80050 GENERAL HEALTH PANEL: CPT

## 2025-05-23 PROCEDURE — 82306 VITAMIN D 25 HYDROXY: CPT

## 2025-05-23 PROCEDURE — 80061 LIPID PANEL: CPT

## 2025-05-23 PROCEDURE — 99396 PREV VISIT EST AGE 40-64: CPT | Performed by: PHYSICIAN ASSISTANT

## 2025-05-23 PROCEDURE — 36415 COLL VENOUS BLD VENIPUNCTURE: CPT

## 2025-05-23 PROCEDURE — 84439 ASSAY OF FREE THYROXINE: CPT

## 2025-05-23 PROCEDURE — 82607 VITAMIN B-12: CPT

## 2025-05-23 NOTE — PROGRESS NOTES
Female Physical Note      Date: 2025   Patient Name: Tiny Turcios  : 1965   MRN: [id number]     Chief Complaint:    Chief Complaint   Patient presents with    Annual Exam       History of Present Illness: Tiny Turcios is a 59 y.o. female who is here today for their annual health maintenance and physical.   History of Present Illness  The patient presents for a routine checkup.    She reports a general sense of well-being, with the exception of intermittent sleep disturbances. Despite these disturbances, she is able to return to sleep without difficulty. She expresses interest in exploring potential vitamin supplementation and continues her regimen of vitamin D supplementation. She is currently fasting and seeks advice on whether she can consume water or food prior to sunrise. Her appetite remains robust, and she does not experience any joint pain. She engages in regular exercise.    She also reports experiencing pruritus within her ear, which she attempts to alleviate by manual scratching.      Subjective      Review of Systems:   Review of Systems    Past Medical History, Social History, Family History and Care Team were all reviewed with patient and updated as appropriate.     Medications:     Current Outpatient Medications:     cholecalciferol (VITAMIN D3) 25 MCG (1000 UT) tablet, Take 1 tablet by mouth Daily., Disp: 90 tablet, Rfl: 3    Multiple Vitamins-Minerals (b complex-C-E-zinc) tablet, Take 1 tablet by mouth Daily., Disp: , Rfl:     Allergies:   Allergies   Allergen Reactions    Statins Other (See Comments)     Stomach Discomfort    Hydrocodone Dizziness and Palpitations    Tamsulosin Dizziness and Palpitations       PHQ-9 Depression Screening  Little interest or pleasure in doing things? Not at all   Feeling down, depressed, or hopeless? Not at all   PHQ-2 Total Score 0   Trouble falling or staying asleep, or sleeping too much?     Feeling tired or having  "little energy?     Poor appetite or overeating?     Feeling bad about yourself - or that you are a failure or have let yourself or your family down?     Trouble concentrating on things, such as reading the newspaper or watching television?     Moving or speaking so slowly that other people could have noticed? Or the opposite - being so fidgety or restless that you have been moving around a lot more than usual?       Thoughts that you would be better off dead, or of hurting yourself in some way?     PHQ-9 Total Score     If you checked off any problems, how difficult have these problems made it for you to do your work, take care of things at home, or get along with other people? Not difficult at all         Objective     Vital Signs:   Vitals:    05/23/25 0749   BP: 102/68   Pulse: 72   Temp: 97.9 °F (36.6 °C)   TempSrc: Infrared   SpO2: 99%   Weight: 53.6 kg (118 lb 3.2 oz)   Height: 160 cm (63\")   PainSc: 0-No pain     Body mass index is 20.94 kg/m².   BMI is within normal parameters. No other follow-up for BMI required.      Physical Exam:   Physical Exam  Vitals and nursing note reviewed.   Constitutional:       General: She is not in acute distress.     Appearance: Normal appearance. She is well-developed.   HENT:      Head: Normocephalic and atraumatic.      Right Ear: Tympanic membrane and ear canal normal. There is no impacted cerumen.      Left Ear: Tympanic membrane and ear canal normal. There is no impacted cerumen.      Nose: Nose normal. No congestion or rhinorrhea.      Mouth/Throat:      Mouth: Mucous membranes are moist.      Pharynx: Oropharynx is clear. No oropharyngeal exudate or posterior oropharyngeal erythema.   Eyes:      General: No scleral icterus.        Right eye: No discharge.         Left eye: No discharge.      Extraocular Movements: Extraocular movements intact.      Conjunctiva/sclera: Conjunctivae normal.      Pupils: Pupils are equal, round, and reactive to light.   Neck:      " Thyroid: No thyromegaly.      Vascular: No carotid bruit.   Cardiovascular:      Rate and Rhythm: Normal rate and regular rhythm.      Heart sounds: Normal heart sounds. No murmur heard.  Pulmonary:      Breath sounds: Normal breath sounds. No wheezing, rhonchi or rales.   Abdominal:      General: Bowel sounds are normal. There is no distension.      Palpations: Abdomen is soft. There is no mass.      Tenderness: There is no abdominal tenderness.   Musculoskeletal:         General: No swelling. Normal range of motion.      Cervical back: Normal range of motion and neck supple.      Right lower leg: No edema.      Left lower leg: No edema.   Lymphadenopathy:      Cervical: No cervical adenopathy.   Skin:     General: Skin is warm.      Coloration: Skin is not jaundiced or pale.      Findings: No bruising or rash.   Neurological:      General: No focal deficit present.      Mental Status: She is alert.      Cranial Nerves: No cranial nerve deficit.      Motor: No weakness.      Gait: Gait normal.   Psychiatric:         Mood and Affect: Mood normal.         Behavior: Behavior normal.         Judgment: Judgment normal.          Procedures    Assessment / Plan      Assessment/Plan:   Diagnoses and all orders for this visit:    1. Annual physical exam (Primary)  -     Lipid panel; Future  -     Hemoglobin A1c; Future  -     T4, free; Future  -     TSH; Future  -     Comprehensive metabolic panel; Future  -     Vitamin B12; Future  -     CBC w AUTO Differential; Future    2. Vitamin D deficiency  -     Vitamin D 25 hydroxy; Future    3. Immunization due  -     Pneumococcal Conjugate Vaccine 20-Valent (PCV20); Future       Assessment & Plan  1. Health maintenance.  - Advised to continue taking vitamin D for 12 weeks.  - Comprehensive blood work panel to be conducted today, including cholesterol, blood sugar, kidney and liver function, thyroid, vitamin B12, vitamin D, and blood counts.  - Discussed the importance of  pneumonia and shingles vaccines; pneumonia vaccine available at the clinic, shingles vaccine to be obtained at a pharmacy.  - No additional questions or concerns raised during the visit.    2. Ear pruritus.  - Skin in the ear appears dry.  - Advised to apply hydrocortisone cream to the affected area to alleviate itchiness.  - No other abnormalities noted during the physical examination.  - Patient will monitor symptoms and follow up if condition persists.    Follow Up:   Return in about 1 year (around 5/23/2026) for Annual.    Healthcare Maintenance:   Counseling provided on vaccines.   Tiny Turcios voices understanding and acceptance of this advice and will call back with any further questions or concerns. AVS with preventive healthcare tips printed for patient.     Patient or patient representative verbalized consent for the use of Ambient Listening during the visit with  Rochelle Stewart PA-C for chart documentation. 5/23/2025  08:10 EDT    Rochelle Stewart PA-C   [facility] [place]

## 2025-06-02 ENCOUNTER — OFFICE VISIT (OUTPATIENT)
Dept: FAMILY MEDICINE CLINIC | Facility: CLINIC | Age: 60
End: 2025-06-02
Payer: COMMERCIAL

## 2025-06-02 VITALS
HEIGHT: 63 IN | DIASTOLIC BLOOD PRESSURE: 60 MMHG | HEART RATE: 72 BPM | WEIGHT: 118.4 LBS | BODY MASS INDEX: 20.98 KG/M2 | TEMPERATURE: 98.4 F | RESPIRATION RATE: 20 BRPM | OXYGEN SATURATION: 99 % | SYSTOLIC BLOOD PRESSURE: 100 MMHG

## 2025-06-02 DIAGNOSIS — E55.9 VITAMIN D DEFICIENCY: ICD-10-CM

## 2025-06-02 DIAGNOSIS — E78.2 MIXED HYPERLIPIDEMIA: Primary | ICD-10-CM

## 2025-06-02 PROCEDURE — 99214 OFFICE O/P EST MOD 30 MIN: CPT | Performed by: HOSPITALIST

## 2025-06-02 RX ORDER — ROSUVASTATIN CALCIUM 5 MG/1
5 TABLET, COATED ORAL DAILY
Qty: 90 TABLET | Refills: 2 | Status: SHIPPED | OUTPATIENT
Start: 2025-06-02

## 2025-06-02 NOTE — PROGRESS NOTES
Follow Up Office Visit      Patient Name: Tiny Turcios  : 1965   MRN: [id number]     Chief Complaint:  Hyperlipidemia (Problems taking statin she has only liptior)     History of Present Illness: Tiny Turcios is a 59 y.o. female who is here today for follow up on labs and medication.      History of Present Illness  The patient is a 59-year-old female who presents for follow-up.    She reports an elevated LDL cholesterol level of 175, which she attributes to dietary indiscretions, including the consumption of red meat prior to her test. She recalls a previous cholesterol test conducted at [facility] 3 years ago, which revealed an LDL level of 142. At that time, she was prescribed atorvastatin but experienced adverse effects such as dizziness and gastrointestinal upset. Subsequent laboratory tests indicated a decrease in her cholesterol levels, prompting her to implement dietary modifications. She consumes wine on a weekly basis and has recently initiated a regular exercise regimen. She also reports experiencing significant stress and anxiety, which occasionally disrupts her sleep. She expresses concern about potential liver damage due to her elevated cholesterol levels and is seeking advice on whether medication is necessary. She has been informed that her elevated cholesterol levels increase her risk of heart disease, further exacerbating her anxiety. She is currently taking vitamin D supplements and multivitamins.    She is currently on a regimen of vitamin D 1000 mg, taking one pill daily, but has been advised to increase the dosage to two pills daily.          Subjective     Subjective          The following portions of the patient's history were reviewed and updated as appropriate: allergies, current medications, past family history, past medical history, past social history, past surgical history and problem list.    Allergy:   Allergies   Allergen Reactions     "Statins Other (See Comments)     Stomach Discomfort    Hydrocodone Dizziness and Palpitations    Tamsulosin Dizziness and Palpitations        Current Medications:   Current Outpatient Medications   Medication Sig Dispense Refill    Multiple Vitamins-Minerals (b complex-C-E-zinc) tablet Take 1 tablet by mouth Daily.      cholecalciferol (VITAMIN D3) 1.25 MG (31053 UT) capsule Take 1 capsule by mouth Every 7 (Seven) Days. 8 capsule 0    rosuvastatin (Crestor) 5 MG tablet Take 1 tablet by mouth Daily. 90 tablet 2     No current facility-administered medications for this visit.       Objective     Objective     Physical Exam:  Physical Exam  Vitals and nursing note reviewed.   Constitutional:       Appearance: Normal appearance.   HENT:      Head: Normocephalic and atraumatic.      Mouth/Throat:      Mouth: Mucous membranes are moist.   Eyes:      Pupils: Pupils are equal, round, and reactive to light.   Cardiovascular:      Rate and Rhythm: Normal rate and regular rhythm.      Heart sounds: Normal heart sounds.   Pulmonary:      Effort: Pulmonary effort is normal.      Breath sounds: Normal breath sounds.   Abdominal:      General: Bowel sounds are normal.      Palpations: Abdomen is soft.   Musculoskeletal:         General: Normal range of motion.      Cervical back: Normal range of motion.   Skin:     General: Skin is warm and dry.   Neurological:      General: No focal deficit present.      Mental Status: She is alert and oriented to person, place, and time.   Psychiatric:         Mood and Affect: Mood normal.         Behavior: Behavior normal.         Vital Signs:   /60 (BP Location: Right arm, Patient Position: Sitting, Cuff Size: Adult)   Pulse 72   Temp 98.4 °F (36.9 °C) (Temporal)   Resp 20   Ht 160 cm (62.99\")   Wt 53.7 kg (118 lb 6.4 oz)   SpO2 99%   BMI 20.98 kg/m²            PHQ-9 Score  PHQ-9 Total Score:      Lab Review  Lab on 05/23/2025   Component Date Value Ref Range Status    Total " Cholesterol 05/23/2025 248 (H)  0 - 200 mg/dL Final    Triglycerides 05/23/2025 36  0 - 150 mg/dL Final    HDL Cholesterol 05/23/2025 68 (H)  40 - 60 mg/dL Final    LDL Cholesterol  05/23/2025 175 (H)  0 - 100 mg/dL Final    VLDL Cholesterol 05/23/2025 5  5 - 40 mg/dL Final    LDL/HDL Ratio 05/23/2025 2.54   Final    Hemoglobin A1C 05/23/2025 5.20  4.80 - 5.60 % Final    Free T4 05/23/2025 1.21  0.92 - 1.68 ng/dL Final    TSH 05/23/2025 3.040  0.270 - 4.200 uIU/mL Final    Glucose 05/23/2025 79  65 - 99 mg/dL Final    BUN 05/23/2025 16  6 - 20 mg/dL Final    Creatinine 05/23/2025 0.78  0.57 - 1.00 mg/dL Final    Sodium 05/23/2025 140  136 - 145 mmol/L Final    Potassium 05/23/2025 4.3  3.5 - 5.2 mmol/L Final    Chloride 05/23/2025 106  98 - 107 mmol/L Final    CO2 05/23/2025 23.2  22.0 - 29.0 mmol/L Final    Calcium 05/23/2025 9.2  8.6 - 10.5 mg/dL Final    Total Protein 05/23/2025 6.9  6.0 - 8.5 g/dL Final    Albumin 05/23/2025 4.3  3.5 - 5.2 g/dL Final    ALT (SGPT) 05/23/2025 18  1 - 33 U/L Final    AST (SGOT) 05/23/2025 29  1 - 32 U/L Final    Alkaline Phosphatase 05/23/2025 72  39 - 117 U/L Final    Total Bilirubin 05/23/2025 0.4  0.0 - 1.2 mg/dL Final    Globulin 05/23/2025 2.6  gm/dL Final    A/G Ratio 05/23/2025 1.7  g/dL Final    BUN/Creatinine Ratio 05/23/2025 20.5  7.0 - 25.0 Final    Anion Gap 05/23/2025 10.8  5.0 - 15.0 mmol/L Final    eGFR 05/23/2025 87.6  >60.0 mL/min/1.73 Final    Vitamin B-12 05/23/2025 685  211 - 946 pg/mL Final    25 Hydroxy, Vitamin D 05/23/2025 26.4 (L)  30.0 - 100.0 ng/ml Final    WBC 05/23/2025 4.60  3.40 - 10.80 10*3/mm3 Final    RBC 05/23/2025 5.12  3.77 - 5.28 10*6/mm3 Final    Hemoglobin 05/23/2025 14.1  12.0 - 15.9 g/dL Final    Hematocrit 05/23/2025 45.0  34.0 - 46.6 % Final    MCV 05/23/2025 87.9  79.0 - 97.0 fL Final    MCH 05/23/2025 27.5  26.6 - 33.0 pg Final    MCHC 05/23/2025 31.3 (L)  31.5 - 35.7 g/dL Final    RDW 05/23/2025 12.1 (L)  12.3 - 15.4 % Final    RDW-SD  05/23/2025 39.0  37.0 - 54.0 fl Final    MPV 05/23/2025 10.5  6.0 - 12.0 fL Final    Platelets 05/23/2025 281  140 - 450 10*3/mm3 Final    Neutrophil % 05/23/2025 49.2  42.7 - 76.0 % Final    Lymphocyte % 05/23/2025 38.9  19.6 - 45.3 % Final    Monocyte % 05/23/2025 10.4  5.0 - 12.0 % Final    Eosinophil % 05/23/2025 0.9  0.3 - 6.2 % Final    Basophil % 05/23/2025 0.4  0.0 - 1.5 % Final    Immature Grans % 05/23/2025 0.2  0.0 - 0.5 % Final    Neutrophils, Absolute 05/23/2025 2.26  1.70 - 7.00 10*3/mm3 Final    Lymphocytes, Absolute 05/23/2025 1.79  0.70 - 3.10 10*3/mm3 Final    Monocytes, Absolute 05/23/2025 0.48  0.10 - 0.90 10*3/mm3 Final    Eosinophils, Absolute 05/23/2025 0.04  0.00 - 0.40 10*3/mm3 Final    Basophils, Absolute 05/23/2025 0.02  0.00 - 0.20 10*3/mm3 Final    Immature Grans, Absolute 05/23/2025 0.01  0.00 - 0.05 10*3/mm3 Final    nRBC 05/23/2025 0.0  0.0 - 0.2 /100 WBC Final        Radiology Results        Assessment / Plan         Assessment and Plan     Diagnoses and all orders for this visit:    1. Mixed hyperlipidemia (Primary)  Assessment & Plan:  - Elevated LDL levels at 175, previously 142.  - Atorvastatin previously caused dizziness and stomach issues.  - Discussion on dietary habits and genetic predisposition.  - Initiated low-dose rosuvastatin, prescription sent to pharmacy.    Orders:  -     rosuvastatin (Crestor) 5 MG tablet; Take 1 tablet by mouth Daily.  Dispense: 90 tablet; Refill: 2    2. Vitamin D deficiency  Assessment & Plan:  - Vitamin D levels are low.  - Patient currently taking 1000 mg but advised to take a higher dose.  - Prescription for high-dose vitamin D, to be taken once weekly, will be provided.    Orders:  -     cholecalciferol (VITAMIN D3) 1.25 MG (82427 UT) capsule; Take 1 capsule by mouth Every 7 (Seven) Days.  Dispense: 8 capsule; Refill: 0        BMI is within normal parameters. No other follow-up for BMI required.       Health Maintenance  Health Maintenance:    Health Maintenance Due   Topic Date Due    Pneumococcal Vaccine 50+ (1 of 1 - PCV) Never done        Meds ordered during this visit  New Medications Ordered This Visit   Medications    cholecalciferol (VITAMIN D3) 1.25 MG (42148 UT) capsule     Sig: Take 1 capsule by mouth Every 7 (Seven) Days.     Dispense:  8 capsule     Refill:  0    rosuvastatin (Crestor) 5 MG tablet     Sig: Take 1 tablet by mouth Daily.     Dispense:  90 tablet     Refill:  2       Meds stopped during this visit:  Medications Discontinued During This Encounter   Medication Reason    cholecalciferol (VITAMIN D3) 25 MCG (1000 UT) tablet         Patient was given instructions and counseling regarding her condition or for health maintenance advice. Please see specific information pulled into the AVS if appropriate.     Follow Up   Return in about 3 months (around 9/2/2025).    Yvette Jung DO  [facility]     Dictated Utilizing Dragon Dictation: Part of this note may be an electronic transcription/translation of spoken language to printed text using the Dragon Dictation System.    Patient or patient representative verbalized consent for the use of Ambient Listening during the visit with  Yvette Jung DO for chart documentation. 6/2/2025  08:28 EDT      This document has been electronically signed by Yvette Jung DO   June 2, 2025 08:28 EDT

## 2025-06-02 NOTE — ASSESSMENT & PLAN NOTE
- Vitamin D levels are low.  - Patient currently taking 1000 mg but advised to take a higher dose.  - Prescription for high-dose vitamin D, to be taken once weekly, will be provided.

## 2025-06-02 NOTE — ASSESSMENT & PLAN NOTE
- Elevated LDL levels at 175, previously 142.  - Atorvastatin previously caused dizziness and stomach issues.  - Discussion on dietary habits and genetic predisposition.  - Initiated low-dose rosuvastatin, prescription sent to pharmacy.